# Patient Record
Sex: MALE | NOT HISPANIC OR LATINO
[De-identification: names, ages, dates, MRNs, and addresses within clinical notes are randomized per-mention and may not be internally consistent; named-entity substitution may affect disease eponyms.]

---

## 2017-07-10 PROBLEM — Z00.00 ENCOUNTER FOR PREVENTIVE HEALTH EXAMINATION: Status: ACTIVE | Noted: 2017-07-10

## 2017-07-30 ENCOUNTER — TRANSCRIPTION ENCOUNTER (OUTPATIENT)
Age: 67
End: 2017-07-30

## 2017-08-14 ENCOUNTER — TRANSCRIPTION ENCOUNTER (OUTPATIENT)
Age: 67
End: 2017-08-14

## 2018-02-03 ENCOUNTER — TRANSCRIPTION ENCOUNTER (OUTPATIENT)
Age: 68
End: 2018-02-03

## 2020-03-24 ENCOUNTER — INPATIENT (INPATIENT)
Facility: HOSPITAL | Age: 70
LOS: 2 days | Discharge: HOME | DRG: 917 | End: 2020-03-27
Attending: INTERNAL MEDICINE | Admitting: INTERNAL MEDICINE
Payer: MEDICARE

## 2020-03-24 VITALS
HEIGHT: 68 IN | HEART RATE: 108 BPM | WEIGHT: 229.94 LBS | SYSTOLIC BLOOD PRESSURE: 123 MMHG | DIASTOLIC BLOOD PRESSURE: 77 MMHG | OXYGEN SATURATION: 100 % | RESPIRATION RATE: 20 BRPM | TEMPERATURE: 98 F

## 2020-03-24 DIAGNOSIS — E87.0 HYPEROSMOLALITY AND HYPERNATREMIA: ICD-10-CM

## 2020-03-24 DIAGNOSIS — Z29.9 ENCOUNTER FOR PROPHYLACTIC MEASURES, UNSPECIFIED: ICD-10-CM

## 2020-03-24 DIAGNOSIS — T28.6XXA CORROSION OF ESOPHAGUS, INITIAL ENCOUNTER: ICD-10-CM

## 2020-03-24 DIAGNOSIS — T30.0 BURN OF UNSPECIFIED BODY REGION, UNSPECIFIED DEGREE: ICD-10-CM

## 2020-03-24 DIAGNOSIS — R79.9 ABNORMAL FINDING OF BLOOD CHEMISTRY, UNSPECIFIED: ICD-10-CM

## 2020-03-24 DIAGNOSIS — E87.2 ACIDOSIS: ICD-10-CM

## 2020-03-24 DIAGNOSIS — T14.91XA SUICIDE ATTEMPT, INITIAL ENCOUNTER: ICD-10-CM

## 2020-03-24 DIAGNOSIS — Z96.651 PRESENCE OF RIGHT ARTIFICIAL KNEE JOINT: Chronic | ICD-10-CM

## 2020-03-24 DIAGNOSIS — R93.89 ABNORMAL FINDINGS ON DIAGNOSTIC IMAGING OF OTHER SPECIFIED BODY STRUCTURES: ICD-10-CM

## 2020-03-24 DIAGNOSIS — K92.0 HEMATEMESIS: ICD-10-CM

## 2020-03-24 DIAGNOSIS — F29 UNSPECIFIED PSYCHOSIS NOT DUE TO A SUBSTANCE OR KNOWN PHYSIOLOGICAL CONDITION: ICD-10-CM

## 2020-03-24 DIAGNOSIS — R00.0 TACHYCARDIA, UNSPECIFIED: ICD-10-CM

## 2020-03-24 DIAGNOSIS — Z98.890 OTHER SPECIFIED POSTPROCEDURAL STATES: Chronic | ICD-10-CM

## 2020-03-24 LAB
ALBUMIN SERPL ELPH-MCNC: 4.4 G/DL — SIGNIFICANT CHANGE UP (ref 3.3–5)
ALP SERPL-CCNC: 50 U/L — SIGNIFICANT CHANGE UP (ref 40–120)
ALT FLD-CCNC: 17 U/L — SIGNIFICANT CHANGE UP (ref 10–45)
ANION GAP SERPL CALC-SCNC: 16 MMOL/L — SIGNIFICANT CHANGE UP (ref 5–17)
ANION GAP SERPL CALC-SCNC: 19 MMOL/L — HIGH (ref 5–17)
APAP SERPL-MCNC: <5 UG/ML — LOW (ref 10–30)
APPEARANCE UR: CLEAR — SIGNIFICANT CHANGE UP
APTT BLD: 21.7 SEC — LOW (ref 27.5–36.3)
AST SERPL-CCNC: 30 U/L — SIGNIFICANT CHANGE UP (ref 10–40)
BASOPHILS # BLD AUTO: 0.04 K/UL — SIGNIFICANT CHANGE UP (ref 0–0.2)
BASOPHILS NFR BLD AUTO: 0.4 % — SIGNIFICANT CHANGE UP (ref 0–2)
BILIRUB SERPL-MCNC: 0.8 MG/DL — SIGNIFICANT CHANGE UP (ref 0.2–1.2)
BILIRUB UR-MCNC: NEGATIVE — SIGNIFICANT CHANGE UP
BUN SERPL-MCNC: 27 MG/DL — HIGH (ref 7–23)
BUN SERPL-MCNC: 34 MG/DL — HIGH (ref 7–23)
CALCIUM SERPL-MCNC: 9.1 MG/DL — SIGNIFICANT CHANGE UP (ref 8.4–10.5)
CALCIUM SERPL-MCNC: 9.8 MG/DL — SIGNIFICANT CHANGE UP (ref 8.4–10.5)
CHLORIDE SERPL-SCNC: 110 MMOL/L — HIGH (ref 96–108)
CHLORIDE SERPL-SCNC: 115 MMOL/L — HIGH (ref 96–108)
CO2 SERPL-SCNC: 20 MMOL/L — LOW (ref 22–31)
CO2 SERPL-SCNC: 21 MMOL/L — LOW (ref 22–31)
COLOR SPEC: YELLOW — SIGNIFICANT CHANGE UP
CREAT SERPL-MCNC: 1.1 MG/DL — SIGNIFICANT CHANGE UP (ref 0.5–1.3)
CREAT SERPL-MCNC: 1.21 MG/DL — SIGNIFICANT CHANGE UP (ref 0.5–1.3)
DIFF PNL FLD: NEGATIVE — SIGNIFICANT CHANGE UP
EOSINOPHIL # BLD AUTO: 0.04 K/UL — SIGNIFICANT CHANGE UP (ref 0–0.5)
EOSINOPHIL NFR BLD AUTO: 0.4 % — SIGNIFICANT CHANGE UP (ref 0–6)
ETHANOL SERPL-MCNC: <10 MG/DL — SIGNIFICANT CHANGE UP (ref 0–10)
GLUCOSE SERPL-MCNC: 166 MG/DL — HIGH (ref 70–99)
GLUCOSE SERPL-MCNC: 200 MG/DL — HIGH (ref 70–99)
GLUCOSE UR QL: NEGATIVE — SIGNIFICANT CHANGE UP
HCT VFR BLD CALC: 49.4 % — SIGNIFICANT CHANGE UP (ref 39–50)
HCT VFR BLD CALC: 49.6 % — SIGNIFICANT CHANGE UP (ref 39–50)
HGB BLD-MCNC: 15.6 G/DL — SIGNIFICANT CHANGE UP (ref 13–17)
HGB BLD-MCNC: 15.8 G/DL — SIGNIFICANT CHANGE UP (ref 13–17)
IMM GRANULOCYTES NFR BLD AUTO: 0.4 % — SIGNIFICANT CHANGE UP (ref 0–1.5)
INR BLD: 1.02 — SIGNIFICANT CHANGE UP (ref 0.88–1.16)
KETONES UR-MCNC: 15 MG/DL
LACTATE SERPL-SCNC: 1.9 MMOL/L — SIGNIFICANT CHANGE UP (ref 0.5–2)
LEUKOCYTE ESTERASE UR-ACNC: NEGATIVE — SIGNIFICANT CHANGE UP
LYMPHOCYTES # BLD AUTO: 1.09 K/UL — SIGNIFICANT CHANGE UP (ref 1–3.3)
LYMPHOCYTES # BLD AUTO: 11.5 % — LOW (ref 13–44)
MCHC RBC-ENTMCNC: 29.9 PG — SIGNIFICANT CHANGE UP (ref 27–34)
MCHC RBC-ENTMCNC: 30 PG — SIGNIFICANT CHANGE UP (ref 27–34)
MCHC RBC-ENTMCNC: 31.6 GM/DL — LOW (ref 32–36)
MCHC RBC-ENTMCNC: 31.9 GM/DL — LOW (ref 32–36)
MCV RBC AUTO: 94.1 FL — SIGNIFICANT CHANGE UP (ref 80–100)
MCV RBC AUTO: 94.6 FL — SIGNIFICANT CHANGE UP (ref 80–100)
MONOCYTES # BLD AUTO: 0.44 K/UL — SIGNIFICANT CHANGE UP (ref 0–0.9)
MONOCYTES NFR BLD AUTO: 4.6 % — SIGNIFICANT CHANGE UP (ref 2–14)
NEUTROPHILS # BLD AUTO: 7.83 K/UL — HIGH (ref 1.8–7.4)
NEUTROPHILS NFR BLD AUTO: 82.7 % — HIGH (ref 43–77)
NITRITE UR-MCNC: NEGATIVE — SIGNIFICANT CHANGE UP
NRBC # BLD: 0 /100 WBCS — SIGNIFICANT CHANGE UP (ref 0–0)
NRBC # BLD: 0 /100 WBCS — SIGNIFICANT CHANGE UP (ref 0–0)
OSMOLALITY SERPL: 324 MOSMOL/KG — HIGH (ref 280–301)
PCP SPEC-MCNC: SIGNIFICANT CHANGE UP
PH UR: 6 — SIGNIFICANT CHANGE UP (ref 5–8)
PLATELET # BLD AUTO: 195 K/UL — SIGNIFICANT CHANGE UP (ref 150–400)
PLATELET # BLD AUTO: 212 K/UL — SIGNIFICANT CHANGE UP (ref 150–400)
POTASSIUM SERPL-MCNC: 4.1 MMOL/L — SIGNIFICANT CHANGE UP (ref 3.5–5.3)
POTASSIUM SERPL-MCNC: 4.2 MMOL/L — SIGNIFICANT CHANGE UP (ref 3.5–5.3)
POTASSIUM SERPL-SCNC: 4.1 MMOL/L — SIGNIFICANT CHANGE UP (ref 3.5–5.3)
POTASSIUM SERPL-SCNC: 4.2 MMOL/L — SIGNIFICANT CHANGE UP (ref 3.5–5.3)
PROT SERPL-MCNC: 7.5 G/DL — SIGNIFICANT CHANGE UP (ref 6–8.3)
PROT UR-MCNC: ABNORMAL MG/DL
PROTHROM AB SERPL-ACNC: 11.6 SEC — SIGNIFICANT CHANGE UP (ref 10–12.9)
RBC # BLD: 5.22 M/UL — SIGNIFICANT CHANGE UP (ref 4.2–5.8)
RBC # BLD: 5.27 M/UL — SIGNIFICANT CHANGE UP (ref 4.2–5.8)
RBC # FLD: 13.9 % — SIGNIFICANT CHANGE UP (ref 10.3–14.5)
RBC # FLD: 14.2 % — SIGNIFICANT CHANGE UP (ref 10.3–14.5)
SALICYLATES SERPL-MCNC: <0.3 MG/DL — LOW (ref 2.8–20)
SODIUM SERPL-SCNC: 150 MMOL/L — HIGH (ref 135–145)
SODIUM SERPL-SCNC: 151 MMOL/L — HIGH (ref 135–145)
SP GR SPEC: >=1.03 — SIGNIFICANT CHANGE UP (ref 1–1.03)
UROBILINOGEN FLD QL: 0.2 E.U./DL — SIGNIFICANT CHANGE UP
WBC # BLD: 19.03 K/UL — HIGH (ref 3.8–10.5)
WBC # BLD: 9.48 K/UL — SIGNIFICANT CHANGE UP (ref 3.8–10.5)
WBC # FLD AUTO: 19.03 K/UL — HIGH (ref 3.8–10.5)
WBC # FLD AUTO: 9.48 K/UL — SIGNIFICANT CHANGE UP (ref 3.8–10.5)

## 2020-03-24 PROCEDURE — 71045 X-RAY EXAM CHEST 1 VIEW: CPT | Mod: 26

## 2020-03-24 PROCEDURE — 71250 CT THORAX DX C-: CPT | Mod: 26

## 2020-03-24 PROCEDURE — 99223 1ST HOSP IP/OBS HIGH 75: CPT | Mod: GC

## 2020-03-24 PROCEDURE — 99223 1ST HOSP IP/OBS HIGH 75: CPT

## 2020-03-24 PROCEDURE — 99285 EMERGENCY DEPT VISIT HI MDM: CPT | Mod: 25

## 2020-03-24 PROCEDURE — 93010 ELECTROCARDIOGRAM REPORT: CPT

## 2020-03-24 RX ORDER — PANTOPRAZOLE SODIUM 20 MG/1
40 TABLET, DELAYED RELEASE ORAL ONCE
Refills: 0 | Status: COMPLETED | OUTPATIENT
Start: 2020-03-24 | End: 2020-03-24

## 2020-03-24 RX ORDER — ONDANSETRON 8 MG/1
4 TABLET, FILM COATED ORAL ONCE
Refills: 0 | Status: COMPLETED | OUTPATIENT
Start: 2020-03-24 | End: 2020-03-24

## 2020-03-24 RX ORDER — SODIUM CHLORIDE 9 MG/ML
1000 INJECTION, SOLUTION INTRAVENOUS
Refills: 0 | Status: DISCONTINUED | OUTPATIENT
Start: 2020-03-24 | End: 2020-03-25

## 2020-03-24 RX ORDER — PANTOPRAZOLE SODIUM 20 MG/1
40 TABLET, DELAYED RELEASE ORAL EVERY 12 HOURS
Refills: 0 | Status: DISCONTINUED | OUTPATIENT
Start: 2020-03-24 | End: 2020-03-27

## 2020-03-24 RX ORDER — PANTOPRAZOLE SODIUM 20 MG/1
8 TABLET, DELAYED RELEASE ORAL
Qty: 80 | Refills: 0 | Status: DISCONTINUED | OUTPATIENT
Start: 2020-03-24 | End: 2020-03-24

## 2020-03-24 RX ORDER — SODIUM CHLORIDE 9 MG/ML
1000 INJECTION INTRAMUSCULAR; INTRAVENOUS; SUBCUTANEOUS ONCE
Refills: 0 | Status: COMPLETED | OUTPATIENT
Start: 2020-03-24 | End: 2020-03-24

## 2020-03-24 RX ADMIN — ONDANSETRON 4 MILLIGRAM(S): 8 TABLET, FILM COATED ORAL at 06:14

## 2020-03-24 RX ADMIN — PANTOPRAZOLE SODIUM 40 MILLIGRAM(S): 20 TABLET, DELAYED RELEASE ORAL at 05:00

## 2020-03-24 RX ADMIN — PANTOPRAZOLE SODIUM 40 MILLIGRAM(S): 20 TABLET, DELAYED RELEASE ORAL at 19:48

## 2020-03-24 RX ADMIN — SODIUM CHLORIDE 1000 MILLILITER(S): 9 INJECTION INTRAMUSCULAR; INTRAVENOUS; SUBCUTANEOUS at 05:30

## 2020-03-24 RX ADMIN — SODIUM CHLORIDE 100 MILLILITER(S): 9 INJECTION, SOLUTION INTRAVENOUS at 13:18

## 2020-03-24 RX ADMIN — PANTOPRAZOLE SODIUM 10 MG/HR: 20 TABLET, DELAYED RELEASE ORAL at 07:13

## 2020-03-24 RX ADMIN — SODIUM CHLORIDE 1000 MILLILITER(S): 9 INJECTION INTRAMUSCULAR; INTRAVENOUS; SUBCUTANEOUS at 05:00

## 2020-03-24 RX ADMIN — Medication 200 MILLIGRAM(S): at 16:10

## 2020-03-24 NOTE — CONSULT NOTE ADULT - SUBJECTIVE AND OBJECTIVE BOX
HPI: 70 year old male with PMH psych disorders (patient states that he thinks he has bipolar disorder and schizophrenia) who presents after an intentional overdose.  The patient states that he has had hot flashes, runny nose and sore throat for months and thinks he has been spreading coronavirus for months.  He drank half of a bottle of Chlorox because he feels responsible for the pandemic.  The patient started to vomit (stated it was red and black) at which point his roommate called 911.  Upon arrival to the ED, vital signs were /77, , temperature 97.8 degrees Farenheit and saturating 100% on room air.  Labs were signifcant for Na 150, Cl 110, bicaronate 21, anion gap 19, BUN 27.    Allergies    No Known Allergies    Intolerances      Home Medications:    MEDICATIONS:  MEDICATIONS  (STANDING):  pantoprazole Infusion 8 mG/Hr (10 mL/Hr) IV Continuous <Continuous>    MEDICATIONS  (PRN):    PAST MEDICAL & SURGICAL HISTORY:  Schizophrenia  Bipolar 1 disorder  Depression    FAMILY HISTORY:    SOCIAL HISTORY:  [place]: [ ] Current, [ ] Former, [ ] Never; Pack Years:  Alcohol:  Illicit Drugs:    REVIEW OF SYSTEMS:  CONSTITUTIONAL: No weakness, fevers or chills  HEENT: No visual changes; No vertigo or throat pain   NECK: No pain or stiffness  RESPIRATORY: No cough, wheezing, hemoptysis; No shortness of breath  CARDIOVASCULAR: No chest pain or palpitations  GASTROINTESTINAL: No abdominal or epigastric pain. No nausea, vomiting, or hematemesis; No diarrhea or constipation. No melena or hematochezia.  GENITOURINARY: No dysuria, frequency or hematuria  NEUROLOGICAL: No numbness or weakness  SKIN: No itching, burning, rashes, or lesions   All other 10 review of systems is negative unless indicated above.    Vital Signs Last 24 Hrs  T(C): 36.6 (24 Mar 2020 04:49), Max: 36.6 (24 Mar 2020 04:49)  T(F): 97.8 (24 Mar 2020 04:49), Max: 97.8 (24 Mar 2020 04:49)  HR: 90 (24 Mar 2020 07:21) (90 - 108)  BP: 168/88 (24 Mar 2020 07:21) (123/77 - 168/88)  BP(mean): --  RR: 20 (24 Mar 2020 07:21) (20 - 20)  SpO2: 98% (24 Mar 2020 07:21) (98% - 100%)      PHYSICAL EXAM:    General: Well developed; well nourished; in no acute distress  Eyes: Anicteric sclerae, moist conjunctivae  HENT: Moist mucous membranes  Neck: Trachea midline, supple  Lungs: Normal respiratory effors and no intercostal retractions  Cardiovascular: RRR  Abdomen: Soft, non-tender non-distended; Normal bowel sounds; No rebound or guarding  Extremities: Normal range of motion, No clubbing, cyanosis or edema  Neurological: Alert and oriented x3  Skin: Warm and dry. No obvious rash    LABS:                        15.8   9.48  )-----------( 212      ( 24 Mar 2020 05:15 )             49.6     03-24    150<H>  |  110<H>  |  27<H>  ----------------------------<  200<H>  4.2   |  21<L>  |  1.21    Ca    9.8      24 Mar 2020 05:15    TPro  7.5  /  Alb  4.4  /  TBili  0.8  /  DBili  x   /  AST  30  /  ALT  17  /  AlkPhos  50  03-24        PT/INR - ( 24 Mar 2020 05:15 )   PT: 11.6 sec;   INR: 1.02          PTT - ( 24 Mar 2020 05:15 )  PTT:21.7 sec    RADIOLOGY & ADDITIONAL STUDIES: HPI: 70 year old male with PMH psych disorders (patient states that he thinks he has bipolar disorder and schizophrenia) who presents after an intentional ingestion of bleach.  The patient states that he has had hot flashes, runny nose and sore throat for months and thinks he has been spreading coronavirus for months.  He drank half of a bottle of Chlorox because he feels responsible for the pandemic.  The patient started to vomit (stated it was secretion with some brown stuff at which point his roommate called 911.  Upon arrival to the ED, vital signs were /77, , temperature 97.8 degrees Farenheit and saturating 100% on room air.  Labs were signifcant for Na 150, Cl 110, bicaronate 21, anion gap 19, BUN 27.    Allergies    No Known Allergies    Intolerances      Home Medications:    MEDICATIONS:  MEDICATIONS  (STANDING):  pantoprazole Infusion 8 mG/Hr (10 mL/Hr) IV Continuous <Continuous>    MEDICATIONS  (PRN):    PAST MEDICAL & SURGICAL HISTORY:  Schizophrenia  Bipolar 1 disorder  Depression    FAMILY HISTORY:    SOCIAL HISTORY:  [place]: [ ] Current, [ ] Former, [ ] Never; Pack Years:  Alcohol:  Illicit Drugs:    REVIEW OF SYSTEMS:  CONSTITUTIONAL: No weakness, fevers or chills  HEENT: No visual changes; No vertigo or throat pain   NECK: No pain or stiffness  RESPIRATORY: No cough, wheezing, hemoptysis; No shortness of breath  CARDIOVASCULAR: No chest pain or palpitations  GASTROINTESTINAL: No abdominal or epigastric pain. No  hematemesis; No constipation. No melena or hematochezia.  GENITOURINARY: No dysuria, frequency or hematuria  NEUROLOGICAL: No numbness or weakness  SKIN: No itching, burning, rashes, or lesions   All other 10 review of systems is negative unless indicated above.    Vital Signs Last 24 Hrs  T(C): 36.6 (24 Mar 2020 04:49), Max: 36.6 (24 Mar 2020 04:49)  T(F): 97.8 (24 Mar 2020 04:49), Max: 97.8 (24 Mar 2020 04:49)  HR: 90 (24 Mar 2020 07:21) (90 - 108)  BP: 168/88 (24 Mar 2020 07:21) (123/77 - 168/88)  BP(mean): --  RR: 20 (24 Mar 2020 07:21) (20 - 20)  SpO2: 98% (24 Mar 2020 07:21) (98% - 100%)      PHYSICAL EXAM:    General:  no acute distress  Eyes: Anicteric sclerae, moist conjunctivae  HENT: Moist mucous membranes  Neck: Trachea midline, supple  Lungs: Normal respiratory effors and no intercostal retractions  Cardiovascular: RRR  Abdomen: Soft, non-tender non-distended; Normal bowel sounds; No rebound or guarding  Extremities: Normal range of motion, No clubbing, cyanosis or edema  Neurological: Alert and awake, answers some questions   Skin: Warm and dry. No obvious rash    LABS:                        15.8   9.48  )-----------( 212      ( 24 Mar 2020 05:15 )             49.6     03-24    150<H>  |  110<H>  |  27<H>  ----------------------------<  200<H>  4.2   |  21<L>  |  1.21    Ca    9.8      24 Mar 2020 05:15    TPro  7.5  /  Alb  4.4  /  TBili  0.8  /  DBili  x   /  AST  30  /  ALT  17  /  AlkPhos  50  03-24        PT/INR - ( 24 Mar 2020 05:15 )   PT: 11.6 sec;   INR: 1.02          PTT - ( 24 Mar 2020 05:15 )  PTT:21.7 sec    RADIOLOGY & ADDITIONAL STUDIES:

## 2020-03-24 NOTE — BEHAVIORAL HEALTH ASSESSMENT NOTE - OTHER PAST PSYCHIATRIC HISTORY (INCLUDE DETAILS REGARDING ONSET, COURSE OF ILLNESS, INPATIENT/OUTPATIENT TREATMENT)
one psychiatric admission, at [facility], 6-7 years ago, for 5 week after a SA by trying to hang himself with a belt

## 2020-03-24 NOTE — BEHAVIORAL HEALTH ASSESSMENT NOTE - NSBHSOCIALHXDETAILSFT_PSY_A_CORE
Patient is a very talented , currently retired. Has been in a relationship with Grady for the last 40 years. He has a brother and a sister.

## 2020-03-24 NOTE — ED ADULT NURSE NOTE - NSFALLRSKUNASSIST_ED_ALL_ED
Your current Orthopaedic problem we are working together to treat is:  Knee replacement.    Hose for 2 more weeks  Walker for 4 more weeks  Can shower, no bath or hot tub  Therapy 2 times a week    It is recommended you schedule a follow-up appointment with Shawn Smith MD in 4 weeks.    Office hours are 8:00 am to 5:00 pm Monday through Friday. If it is urgent that you speak with someone outside of these hours, our [facility] will be able to assist you. You can reach the office by calling the [facility] scheduling line at [phone].    We do highly recommend S[employer] NOW, if you do not already have this. You can request access via the internet or by simply talking with a  at any of the clinics.  [email or web address].      Thank you for choosing [facility] as your Orthopaedic provider!    
no

## 2020-03-24 NOTE — H&P ADULT - ASSESSMENT
70M with extensive psych history now presents from home after hematemesis in the setting of caustic ingestion

## 2020-03-24 NOTE — H&P ADULT - PROBLEM SELECTOR PLAN 7
Likely 2/2 to caustic ingestion causing upper GI irritation. Management as above per GI    #hyperglycemia and obesity  - A1c  - insulin coverage as necessary pending a1c

## 2020-03-24 NOTE — ED ADULT NURSE NOTE - OTHER COMPLAINTS
lee from home pt reports drinking "a lot" of clorox tonight because he wanted to kill himself.  c/o of abd pain, nausea and vomiting.  Hx of mental health problem

## 2020-03-24 NOTE — CONSULT NOTE ADULT - ASSESSMENT
70 year old male with PMH psych disorders (patient states that he thinks he has bipolar disorder and schizophrenia) who presents after an intentional overdose.    Cardiovascular  #Tachycardia  -Patient with very dry mucouswith half normal saline at 150cc/hour    GI  #Hematemesis  -Patient drank half of a bottle of Chlorox in a suicide attempt and afterwards started to have red and black vomiting per patient.  Hemoglobin currently 15.8 and patient hemodynamically stable.  No visible hematemesis on exam.  Would call poison control for evaluation  Patient needs to be NPO with PPI as well as GI evaluation for endoscopy after toxic ingestion.    Psych  #Depression/bipolar/schizophrenia  -Patient states he has depression for which he takes Lexapro and thinks he has bipolar disorder and schizophrenia as well, would have patient on constant observation and needs psychiatric evaluation as he intentionally overdosed by drinking half of a bottle of Chlorox.    No indication for telemetry at this time, please reconsult as needed 70 year old male with PMH psych disorders (patient states that he thinks he has bipolar disorder and schizophrenia) who presents after an intentional overdose.    GI  #Hematemesis  -Patient drank half of a bottle of Chlorox in a suicide attempt and afterwards started to have red and black vomiting per patient.  Hemoglobin currently 15.8 and patient hemodynamically stable.  No visible hematemesis on exam.  Would call poison control for evaluation.  Patient needs to be NPO with PPI as well as GI evaluation for endoscopy after toxic ingestion.  Patient with large amounts of emesis and very dry mucous membranes on exam with tachycardia, would start 150cc/hour of half normal saline as patient with high sodium and chloride.    Psych  #Depression/bipolar/schizophrenia  -Patient states he has depression for which he takes Lexapro and thinks he has bipolar disorder and schizophrenia as well, would have patient on constant observation and needs psychiatric evaluation as he intentionally overdosed by drinking half of a bottle of Chlorox.    No indication for telemetry at this time, please reconsult as needed

## 2020-03-24 NOTE — ED PROVIDER NOTE - PHYSICAL EXAMINATION
GEN: elderly, awake, alert, oriented to person, place, time/situation and in distress, active coffee ground emesis  ENT: Airway patent, Nasal mucosa clear. Mouth with erythematous mucosa, no stridor  EYES: Clear bilaterally. PERRL, EOMI  RESPIRATORY: Breathing comfortably with normal RR. No W/C/R, no hypoxia or resp distress.  CARDIAC: Regular rate and rhythm, no M/R/G  ABDOMEN: Soft, nontender, +bowel sounds, no rebound, rigidity, or guarding.  MSK: Range of motion is not limited, no deformities noted.  NEURO: Alert and oriented, no focal deficits.  SKIN: Skin normal color for race, warm, dry and intact. No evidence of rash.  PSYCH: Alert and oriented to person, place, time/situation. perseverating, anxious mood and affect. no apparent risk to self or others.

## 2020-03-24 NOTE — CONSULT NOTE ADULT - ATTENDING COMMENTS
This patient was evaluated briefly with the resident and management decisions were made, see above for the details, I agree with the A/p.  -toxic chemical ingestion  -suicide attempt  -bipolar disorder  -hypernatremia  >poison control  >psych eval  >IVF  >PPI  >GI  This patient can be managed outside of the ICU currently, you may reconsult us as needed.

## 2020-03-24 NOTE — H&P ADULT - PROBLEM SELECTOR PLAN 8
CT scan as above, given localization to R lung suspect aspiration pneumonitis. low suspicion for covid as patient without cough, fevers, sob, or lymphopenia  - continue to monitor O2 needs.

## 2020-03-24 NOTE — ED ADULT NURSE REASSESSMENT NOTE - NS ED NURSE REASSESS COMMENT FT1
Pt arrives to the ER Alert and Oriented X3. Disoriented to place. Effect: depressed, tearful, cooperative, well-groomed. Security at bedside, warded pt and belongings. Pt was on 1:1 observation for safety.

## 2020-03-24 NOTE — H&P ADULT - NSHPREVIEWOFSYSTEMS_GEN_ALL_CORE
REVIEW OF SYSTEMS:  CONSTITUTIONAL: No fevers or chills  EYES/ENT: + throat pain  NECK: No pain or stiffness  RESPIRATORY: No cough, wheezing; No shortness of breath  CARDIOVASCULAR: No chest pain or palpitations  GASTROINTESTINAL: No abdominal pain. No nausea, vomiting; No diarrhea or constipation.  GENITOURINARY: No dysuria, frequency or hematuria  NEUROLOGICAL: No numbness or weakness  SKIN: No itching, burning, rashes, or lesions   PSYCH: + PARANOIA and PERSEVERATION  All other review of systems is negative unless indicated above.

## 2020-03-24 NOTE — CONSULT NOTE ADULT - ASSESSMENT
70 year old male with PMH psych disorders (patient states that he thinks he has bipolar disorder and schizophrenia) who presents after an intentional ingestion of bleach    Caustic ingestion: household bleach  -vss, maintaining airway, hb wnl (looks hemoconcentrated)  -no vomiting at present, denies pain abdomen, no melena   -CT shows esophageal wall thickening, no perforation   -per literature review household bleach has a ph around 10-11 that is not very high to cause significant/penetrating mucosal injury and most pts recover well  - currently npo, start clears advance slowly as tolerated  -IVF, looks dry on exam ( expect hb to go down with rehydration)  -PPI bid, zofran scheduled   -no indication for urgent endoscopic evaluation  this time

## 2020-03-24 NOTE — H&P ADULT - NSHPSOCIALHISTORY_GEN_ALL_CORE
Patient currently  living with his partner-Noah nicholson [phone]. Patient does not use walker or cane. Has attempted suicide in past.

## 2020-03-24 NOTE — H&P ADULT - PROBLEM SELECTOR PLAN 6
increased anion gap metabolic acidosis POA. lactate WNL, calcium oxylate noted in urine. Per poison control bleech can cause metabolic acidosis and also bowel necrosis. Low suspicion for bowel necrosis currently as GI exam benign and lactate WNL.   - monitor bmp-recheck in evening  - serum osm, and serum ethylene glycol level to be sent due to evidence of calcium oxylate crystals in urine.

## 2020-03-24 NOTE — H&P ADULT - PROBLEM SELECTOR PLAN 10
DVT ppx: will hold pending UGIB  GI ppx: c/w protonix    F: IVF as needed  E: K > 4. Mg >2  N: NPO for now

## 2020-03-24 NOTE — H&P ADULT - PROBLEM SELECTOR PLAN 5
likely multifactorial as patient with ingestion of sodium hypochlorite this am, per poison control can cause transient hypernatremia. Additionally, with spec grav 1.030 patient likely dry. Patient asymptomatic, and alert and orient x3 and mentating well now s/p 2L NS in ED.  - Trend BMP  - monitor mental status

## 2020-03-24 NOTE — BEHAVIORAL HEALTH ASSESSMENT NOTE - HPI (INCLUDE ILLNESS QUALITY, SEVERITY, DURATION, TIMING, CONTEXT, MODIFYING FACTORS, ASSOCIATED SIGNS AND SYMPTOMS)
70M with  with unclear PPH, likely bipolar disorder, one prior psychiatric admission s/p SA, presenting to [facility] after hematemesis in the setting of caustic ingestion as a suicide attempt.   The interview with the patient was limited due to patient having physical discomfort and severe nausea. Collaterals were obtained from patient's lifelong partner Grady Emanuel.    As per the patient, he has been feeling depressed, anxious and very guilty during the last few weeks in context of having thoughts that he triggered the current Covid 19 epidemic. Patient reports that he has been having Si for the last 1-2 weeks. Yesterday night he became very scared that his partner  (checked his pulse and couldn't find it). He went to the bathtub and started drinking from a bottle of bleach in a suicide attempt.  As per his partner, patient has been very paranoid for the last 2-3 week making statements that he caused the epidemic and "they were being watched from across the hallway", " they are going to come in to get us and put us away". Patient was also having severe insomnia and high anxiety. PAtient's partner states that last week he heard the patient making choking sounds and found him with a mouthful of coins (tried to choke himself). Patient was not taken to the ER at that time. Yesterday, Grady was woken up at 4am by the smell of bleach and found the patient in the bathtub covered in blood.   As per Grady, patient saw for 2 years a therapist (until the therapist moved to [place]). Patient never saw a psychiatrist. He is currently prescribed 70M with  with unclear PPH, likely bipolar disorder, one prior psychiatric admission s/p SA, presenting to [facility] after hematemesis in the setting of caustic ingestion as a suicide attempt.   The interview with the patient was limited due to patient having physical discomfort and severe nausea. Collaterals were obtained from patient's lifelong partner Grady Emanuel.    As per the patient, he has been feeling depressed, anxious and very guilty during the last few weeks in context of having thoughts that he triggered the current Covid 19 epidemic. Patient reports that he has been having Si for the last 1-2 weeks. Yesterday night he became very scared that his partner  (checked his pulse and couldn't find it). He went to the bathtub and started drinking from a bottle of bleach in a suicide attempt.  As per his partner, patient has been very paranoid for the last 2-3 week making statements that he caused the epidemic and "they were being watched from across the hallway", " they are going to come in to get us and put us away". Patient was also having severe insomnia and high anxiety. PAtient's partner states that last week he heard the patient making choking sounds and found him with a mouthful of coins (tried to choke himself). Patient was not taken to the ER at that time. Yesterday, Grady was woken up at 4am by the smell of bleach and found the patient in the bathtub covered in blood.   As per Grady, patient saw for 2 years a therapist (until the therapist moved to [place]). Patient never saw a psychiatrist. He is currently prescribed by PMD lexapro either 20mg or 30mg daily (unclear at this time) and trazodone 50mg po qhs. 70M with  with unclear PPH, likely bipolar disorder, one prior psychiatric admission s/p SA, presenting to [facility] after hematemesis in the setting of caustic ingestion as a suicide attempt.   The interview with the patient was limited due to patient having physical discomfort and severe nausea. Collaterals were obtained from patient's lifelong partner Grady Emanuel.    As per the patient, he has been feeling depressed, anxious and very guilty during the last few weeks in context of having thoughts that he triggered the current Covid 19 epidemic. Patient reports that he has been having Si for the last 1-2 weeks. Yesterday night he became very scared that his partner  (checked his pulse and couldn't find it). He went to the bathtub and started drinking from a bottle of bleach in a suicide attempt.  As per his partner, patient has been very paranoid for the last 2-3 weeks making statements that he caused the epidemic and "they were being watched from across the hallway", " they are going to come in to get us and put us away". Patient was also having severe insomnia and high anxiety. PAtient's partner states that last week he heard the patient making choking sounds and found him with a mouthful of coins (tried to choke himself). Patient was not taken to the ER at that time. Yesterday, Grady was woken up at 4am by the smell of bleach and found the patient in the bathtub covered in blood.   As per Grady, patient saw for 2 years a therapist (until the therapist moved to [place]). Patient never saw a psychiatrist. He is currently prescribed by PMD lexapro either 20mg or 30mg daily (unclear at this time) and trazodone 50mg po qhs.

## 2020-03-24 NOTE — BEHAVIORAL HEALTH ASSESSMENT NOTE - SUICIDE RISK FACTORS
Agitation/Severe Anxiety/Panic/Impulsivity/Insomnia/Psychotic disorder current/past/Hopelessness or despair

## 2020-03-24 NOTE — H&P ADULT - NSHPLABSRESULTS_GEN_ALL_CORE
.  LABS:                         15.8   9.48  )-----------( 212      ( 24 Mar 2020 05:15 )             49.6     03-24    150<H>  |  110<H>  |  27<H>  ----------------------------<  200<H>  4.2   |  21<L>  |  1.21    Ca    9.8      24 Mar 2020 05:15    TPro  7.5  /  Alb  4.4  /  TBili  0.8  /  DBili  x   /  AST  30  /  ALT  17  /  AlkPhos  50  03-24    PT/INR - ( 24 Mar 2020 05:15 )   PT: 11.6 sec;   INR: 1.02          PTT - ( 24 Mar 2020 05:15 )  PTT:21.7 sec  Urinalysis Basic - ( 24 Mar 2020 07:09 )    Color: Yellow / Appearance: Clear / SG: >=1.030 / pH: x  Gluc: x / Ketone: 15 mg/dL  / Bili: Negative / Urobili: 0.2 E.U./dL   Blood: x / Protein: Trace mg/dL / Nitrite: NEGATIVE   Leuk Esterase: NEGATIVE / RBC: < 5 /HPF / WBC < 5 /HPF   Sq Epi: x / Non Sq Epi: 0-5 /HPF / Bacteria: Present /HPF      CARDIAC MARKERS ( 24 Mar 2020 05:15 )  x     / 0.01 ng/mL / 157 U/L / x     / x            Lactate, Blood: 1.9 mmol/L (03-24 @ 08:38)      < from: CT Chest No Cont (03.24.20 @ 07:45) >      Findings:     Lungs and large airways: There are several faint areas of centrilobular groundglass opacities in the right upper lobe -apical and posterior segments and lower lobe-superior segment. Differential diagnosis includes aspiration, edema, hemorrhage, infection.    There are numerous calcified lung granulomas bilaterally.     Pleura:  No pleural effusion.    Mediastinum and hilar regions: No thoracic lymphadenopathy. Small calcified mediastinal and right hilar nodes.    Heart and pericardium:  Heart size is normal. No pericardial effusion.    Vessels:  Severe coronary artery disease. Trace calcified plaque aorta.    Chest wall and lower neck:  Normal.    Upper abdomen: Diffuse thoracic and abdominal esophagealwall thickening likely due to esophagitis from caustic ingestion. No esophageal perforation. No pneumomediastinum.      Bones: Mild degenerative changes.      Impression:  1. Diffuse esophageal wall thickening likely esophagitis in setting of causticingestion. No esophageal perforation. No pneumomediastinum.  2. Ground glass opacities seen in the right upper and lower lobe. Differential includes aspiration, edema, hemorrhage, infection.     < end of copied text >

## 2020-03-24 NOTE — H&P ADULT - HISTORY OF PRESENT ILLNESS
70M with psych pmhx (?bipolar disorder, ?schizophrenia-with past suicide attempts) presents after hematemesis in the setting of caustic ingestion. Patient has been feeling sad, depressed and guilty for last 4 months in addition to experiencing multiple "hot flashes" and runny nose and has now been concerned that he is responsible for the coronavirus pandemic. Because of the guilt associated with his self blame for covid pandemic (and acute episode of psychosis when he believed his partner had passed away) patient attempted to intentionally overdose this am by drinking half a bottle of bleach while laying down in bathtub. After episode patient experienced multiple episodes of emesis with bloody and black flecks in the vomitus-unable to quantify. Did not attempt PO afterwards. Additionally patient notes bottle of bleach knocked over while laying in bath tub with bleach coming into contact with his back. After episode patient noted coughing but denies light-headedness, CP, sob, stridor, or stomach pain.     ED course:   Vitals: T afebrile, HR , //88, RR 20, SPo2 % on room air  Labs: no leukocytosis (no bands, no lymphopenia), Hgb 15.8, , Na 150, Cl 110, Bicarb 21-gap 19, BUN 27, glucose 200, LFTs WNL, utox negative, acetaminophen level <5,  BAL< 10, salicylate <0.3. Lactate 1.9. Utox negative. UA spec grav 1.030, ketones present, calcium oxylate crystals present, no hematuria. Proteinuria.   Imaging: CT Chest No Cont  1. Diffuse esophageal wall thickening likely esophagitis in setting of causticingestion. No esophageal perforation. No pneumomediastinum.  2. Ground glass opacities seen in the right upper and lower lobe. Differential includes aspiration, edema, hemorrhage, infection.   Meds: 1L NS, 4mg IV zofran, 40mg IV protnix and was started on a protonix drip  Consults: ICU, Psych, GI

## 2020-03-24 NOTE — ED PROVIDER NOTE - CLINICAL SUMMARY MEDICAL DECISION MAKING FREE TEXT BOX
70M with a psych hx and previous suicide attempts who p/w suicide attempts by caustic ingestion after drinking almost an entire bottle of bleach. Pt with erythema of oral cavity and coffee ground emesis, currently protecting airway but will monitor closely. Labs, CXR and CT chest ordered to r/o perforation. IVFs, Protonix bolus/gtt, and zofran ordered. No indication for charcoal or emetics. Pt claims he has had sx of coronavirus for "months" and that he is responsible for "infecting everyone" however does not currently have fever or URI/resp symptoms to warrant covid19 testing. Pt is not medically cleared for psych eval, he was placed on a 1:1. Will require admission, ICU consult and Gi consult.

## 2020-03-24 NOTE — H&P ADULT - PROBLEM SELECTOR PLAN 4
Patient tachycardic to 108, BPS normotensive to hypertensive. Likely 2/2 to pain vs. anxiety. Tachycardia now resolved and BPs normotensive. Patient mentating and making urine, VSS. Low suspicion for active bleed.  - monitor vitals  - maintain IVF access as needed  - type and screen

## 2020-03-24 NOTE — H&P ADULT - NSHPPHYSICALEXAM_GEN_ALL_CORE
Vital Signs (24 Hrs):  T(C): 37.2 (03-24-20 @ 09:04), Max: 37.2 (03-24-20 @ 09:04)  HR: 77 (03-24-20 @ 09:04) (77 - 108)  BP: 154/94 (03-24-20 @ 09:04) (123/77 - 168/88)  RR: 18 (03-24-20 @ 09:04) (18 - 20)  SpO2: 98% (03-24-20 @ 09:04) (98% - 100%)  Wt(kg): --    PHYSICAL EXAM:  GENERAL: Obese male sitting in bed in NAD. Speaking in full sentences protecting airway  HEENT: no oropharyngeal erythema or ulcerations, dry blood noted surrounding mouth and on teeth  no stridor appreciated over trachea  CHEST/LUNG: No crepitus appreciated over chest wall. Lungs clear to auscultation bilaterally; No wheeze, rhonchi, or rales  HEART: Manual HR measured 64 BPM, RRR, S1S2, no murmurs.   ABDOMEN: Soft, Nontender, Nondistended; Bowel sounds +4. No guarding, rebound or rigidity, no peritoneal signs.  EXTREMITIES:  2+ Peripheral Pulses, blood stained fingers  PSYCH: AAOx3, cooperative, appropriate. Paranoid and perseverating on corona virus  NEUROLOGY: AAOx3, CN II-XII intact. Strength 5/5 throughout. Sensation intact. Appropriate cerebellar functions.   SKIN: Diffuse area of erythema noted on back and buttocks with no skin break down, no bullae, no pustules.

## 2020-03-24 NOTE — ED ADULT NURSE NOTE - OBJECTIVE STATEMENT
Pt coming from home after drinking half a bottle of Clorox 1 hour prior from ER arrival (with the intention of "killing myself). Pt states "I caused the coronavirus outbreak. Do not get close to me. I have the coronavirus." When EMS arrived at the scene, pt was vomiting at the sink with blood tinged emesis. Partner called 911.  Pt states he went to his PCP about 1 week ago, however patient was not tested because "I did not have the symptoms." Pt asked "are you gonna test me now." Pt denies any fever, chest pain,, or coughs. Pt report chills, and hot flashes for "couple months now." Pt states he also hears voices that tell him to "kill himself." Attempted suicide in the pst by "choking myself with a belt." Pt denies HI.
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
## 2020-03-24 NOTE — H&P ADULT - PROBLEM SELECTOR PLAN 1
Patient with episodes of emesis this am in the setting of caustic ingestion with bleach. Emesis noted to be black and bloody patient unable to quantify. Did not attempt to tolerate PO. [facility] who advised monitoring of HD status and GI consult. Patient currently tachycardic but manual HR count in ED 64 bpm. Mentating well, making urine, BPs normotensive hgb 15. Patient s/p 2L in ED given zofran and protonix. Bleed likely UGI 2/2 to caustic ingestion with bleach  - NPO for now, will c/w protonix gtt for now pending further GI recs  - GI following appreciate recs, for scope later today to evaluate extent of damage will keep NPO for now  - maintain 2 large bore IV access, monitor HD  - Type and screen

## 2020-03-24 NOTE — ED PROVIDER NOTE - OBJECTIVE STATEMENT
70M with a hx of depression and mental health issues (pt unable to state which, but states he takes "4 tranquilizers") who p/w suicide attempt by intentional bleach ingestion 30 min PTA. Pt states he drank almost the entire small (?64 oz) of bleach in an attempt to "kill the coronavirus." Pt states he has had symptoms "for months" and he wanted to kill himself bc he feels responsible for "infecting everyone." He also reports voices are telling him to kill himself. En route he vomited brownish emesis. He denies fevers, chills, or SOB. No recent travel or contact with known covid+ patient. He reports suicide attempt in the past by attempted strangulation with a belt around his neck.

## 2020-03-24 NOTE — H&P ADULT - PROBLEM SELECTOR PLAN 2
Discussed with poison control center, patient at risk for laryngeal edema and GI perforation. Currently patient speaking in full sentences, no stridor appreciated over upper airway on exam, no crepitus over chest wall, no evidence of free air on imaging. CT consistent with esophagitis.  - GI following appreciate recs  - for scope later today  - Plan as above  - NPO for now

## 2020-03-24 NOTE — BEHAVIORAL HEALTH ASSESSMENT NOTE - SUICIDE PROTECTIVE FACTORS
Positive therapeutic relationships/Ability to cope with stress/Identifies reasons for living/Supportive social network of family or friends/Responsibility to family and others

## 2020-03-24 NOTE — CONSULT NOTE ADULT - SUBJECTIVE AND OBJECTIVE BOX
[facility] SERVICE CONSULTATION NOTE    CC: Suicide attempt    HPI: 70 year old male with PMH psych disorders (patient states that he thinks he has bipolar disorder and schizophrenia) who presents after an intentional overdose.  The patient states that he has had hot flashes, runny nose and sore throat for months and thinks he has been spreading coronavirus for months.  He drank half of a bottle of Chlorox because he feels responsible for the pandemic.  The patient started to vomit (stated it was red and black) at which point his roommate called 911.  Upon arrival to the ED, vital signs were /77, , temperature 97.8 degrees Farenheit and saturating 100% on room air.  Labs were signifcant for Na 150, Cl 110, bicaronate 21, anion gap 19, BUN 27.  He received 1L NS, 4mg IV zofran, 40mg IV protnix and was started on a protonix drip at which point ICU was consutled    ROS:  Otherwise negative, except as specified in HPI.    PMH:    PSH:    FH:    SH:    ALLERGIES:    MEDICATIONS:    VITAL SIGNS:  ICU Vital Signs Last 24 Hrs  T(C): 36.6 (24 Mar 2020 04:49), Max: 36.6 (24 Mar 2020 04:49)  T(F): 97.8 (24 Mar 2020 04:49), Max: 97.8 (24 Mar 2020 04:49)  HR: 108 (24 Mar 2020 04:49) (108 - 108)  BP: 123/77 (24 Mar 2020 04:49) (123/77 - 123/77)  BP(mean): --  ABP: --  ABP(mean): --  RR: 20 (24 Mar 2020 04:49) (20 - 20)  SpO2: 100% (24 Mar 2020 04:49) (100% - 100%)    CAPILLARY BLOOD GLUCOSE          PHYSICAL EXAM:  Constitutional: resting comfortably in bed, NAD  HEENT: NC/AT; PERRL, anicteric sclera; no oropharyngeal erythema or exudates; MMM  Neck: supple, no appreciable JVD  Respiratory: CTA B/L, no W/R/R; respirations appear non-labored, conversive in full sentences  Cardiovascular: +S1/S2, RRR  Gastrointestinal: abdomen soft, NT/ND  Extremities: WWP; no clubbing, cyanosis or edema  Vascular: 2+ radial, femoral, and DP/PT pulses B/L  Dermatologic: skin normal color and turgor; no visible rashes  Neurological:     LABS:                        15.8   9.48  )-----------( 212      ( 24 Mar 2020 05:15 )             49.6     03-24    150<H>  |  110<H>  |  27<H>  ----------------------------<  200<H>  4.2   |  21<L>  |  1.21    Ca    9.8      24 Mar 2020 05:15    TPro  7.5  /  Alb  4.4  /  TBili  0.8  /  DBili  x   /  AST  30  /  ALT  17  /  AlkPhos  50  03-24    PT/INR - ( 24 Mar 2020 05:15 )   PT: 11.6 sec;   INR: 1.02          PTT - ( 24 Mar 2020 05:15 )  PTT:21.7 sec              EKG: Reviewed.    RADIOLOGY & ADDITIONAL TESTS: Reviewed. [facility] SERVICE CONSULTATION NOTE    CC: Suicide attempt    HPI: 70 year old male with PMH psych disorders (patient states that he thinks he has bipolar disorder and schizophrenia) who presents after an intentional overdose.  The patient states that he has had hot flashes, runny nose and sore throat for months and thinks he has been spreading coronavirus for months.  He drank half of a bottle of Chlorox because he feels responsible for the pandemic.  The patient started to vomit (stated it was red and black) at which point his roommate called 911.  Upon arrival to the ED, vital signs were /77, , temperature 97.8 degrees Farenheit and saturating 100% on room air.  Labs were signifcant for Na 150, Cl 110, bicaronate 21, anion gap 19, BUN 27.  He received 1L NS, 4mg IV zofran, 40mg IV protnix and was started on a protonix drip at which point ICU was consulted.      REVIEW OF SYSTEMS:    CONSTITUTIONAL: No weakness, fevers or chills  EYES/ENT: No visual changes;  No vertigo or throat pain   NECK: No pain or stiffness  RESPIRATORY: No cough, wheezing, hemoptysis; No shortness of breath  CARDIOVASCULAR: No chest pain or palpitations  GASTROINTESTINAL: No abdominal or epigastric pain. No nausea, vomiting, or hematemesis; No diarrhea or constipation. No melena or hematochezia.  GENITOURINARY: No dysuria, frequency or hematuria  NEUROLOGICAL: No numbness or weakness  SKIN: No itching, burning, rashes, or lesions   All other review of systems is negative unless indicated above.      PMH: Depression    PSH:    FH:    SH:    ALLERGIES:    MEDICATIONS:    VITAL SIGNS:  ICU Vital Signs Last 24 Hrs  T(C): 36.6 (24 Mar 2020 04:49), Max: 36.6 (24 Mar 2020 04:49)  T(F): 97.8 (24 Mar 2020 04:49), Max: 97.8 (24 Mar 2020 04:49)  HR: 108 (24 Mar 2020 04:49) (108 - 108)  BP: 123/77 (24 Mar 2020 04:49) (123/77 - 123/77)  BP(mean): --  ABP: --  ABP(mean): --  RR: 20 (24 Mar 2020 04:49) (20 - 20)  SpO2: 100% (24 Mar 2020 04:49) (100% - 100%)    CAPILLARY BLOOD GLUCOSE          PHYSICAL EXAM:  Constitutional: resting comfortably in bed, NAD  HEENT: NC/AT; PERRL, anicteric sclera; no oropharyngeal erythema or exudates; MMM  Neck: supple, no appreciable JVD  Respiratory: CTA B/L, no W/R/R; respirations appear non-labored, conversive in full sentences  Cardiovascular: +S1/S2, RRR  Gastrointestinal: abdomen soft, NT/ND  Extremities: WWP; no clubbing, cyanosis or edema  Vascular: 2+ radial, femoral, and DP/PT pulses B/L  Dermatologic: skin normal color and turgor; no visible rashes  Neurological:     LABS:                        15.8   9.48  )-----------( 212      ( 24 Mar 2020 05:15 )             49.6     03-24    150<H>  |  110<H>  |  27<H>  ----------------------------<  200<H>  4.2   |  21<L>  |  1.21    Ca    9.8      24 Mar 2020 05:15    TPro  7.5  /  Alb  4.4  /  TBili  0.8  /  DBili  x   /  AST  30  /  ALT  17  /  AlkPhos  50  03-24    PT/INR - ( 24 Mar 2020 05:15 )   PT: 11.6 sec;   INR: 1.02          PTT - ( 24 Mar 2020 05:15 )  PTT:21.7 sec              EKG: Reviewed.    RADIOLOGY & ADDITIONAL TESTS: Reviewed. [facility] SERVICE CONSULTATION NOTE    CC: Suicide attempt    HPI: 70 year old male with PMH psych disorders (patient states that he thinks he has bipolar disorder and schizophrenia) who presents after an intentional overdose.  The patient states that he has had hot flashes, runny nose and sore throat for months and thinks he has been spreading coronavirus for months.  He drank half of a bottle of Chlorox because he feels responsible for the pandemic.  The patient started to vomit (stated it was red and black) at which point his roommate called 911.  Upon arrival to the ED, vital signs were /77, , temperature 97.8 degrees Farenheit and saturating 100% on room air.  Labs were signifcant for Na 150, Cl 110, bicaronate 21, anion gap 19, BUN 27.  He received 1L NS, 4mg IV zofran, 40mg IV protnix and was started on a protonix drip at which point ICU was consulted.      REVIEW OF SYSTEMS:    CONSTITUTIONAL: No weakness, fevers or chills, endorses hot flashes  EYES/ENT: No visual changes;  Endorses sore throat and rhinorrhea  NECK: No pain or stiffness  RESPIRATORY: No cough, wheezing, hemoptysis; No shortness of breath  CARDIOVASCULAR: No chest pain or palpitations  GASTROINTESTINAL: No abdominal or epigastric pain. Endorses vomiting; No diarrhea or constipation. No melena or hematochezia.  GENITOURINARY: No dysuria, frequency or hematuria  NEUROLOGICAL: No numbness or weakness  SKIN: No itching, burning, rashes, or lesions   All other review of systems is negative unless indicated above.      PMH: Depression, bipolar, schizophrenia    PSH: Multiple hernia repairs, knee replacements    FH: Father was paranoid schizophrenia, sister has bipolar disorder    SH: Denies tobacco or drug use, endorses infrequent alcohol use    ALLERGIES: Codeine-causes upset stomach    MEDICATIONS: Lexapro-does not know other medications    VITAL SIGNS:  ICU Vital Signs Last 24 Hrs  T(C): 36.6 (24 Mar 2020 04:49), Max: 36.6 (24 Mar 2020 04:49)  T(F): 97.8 (24 Mar 2020 04:49), Max: 97.8 (24 Mar 2020 04:49)  HR: 108 (24 Mar 2020 04:49) (108 - 108)  BP: 123/77 (24 Mar 2020 04:49) (123/77 - 123/77)  BP(mean): --  ABP: --  ABP(mean): --  RR: 20 (24 Mar 2020 04:49) (20 - 20)  SpO2: 100% (24 Mar 2020 04:49) (100% - 100%)    CAPILLARY BLOOD GLUCOSE          PHYSICAL EXAM:  Constitutional: Elderly male resting comfortably in bed, NAD  HEENT: NC/AT; very dry mucous membranes  Neck: supple, no appreciable JVD  Respiratory: CTA B/L, no W/R/R; respirations appear non-labored, conversive in full sentences  Cardiovascular: +S1/S2, RRR, no murmurs/rubs/gallops  Gastrointestinal: abdomen soft, NT/ND  Extremities: WWP; no clubbing, cyanosis or edema  Vascular: 2+ radial, femoral, and DP/PT pulses B/L  Dermatologic: skin normal color and turgor; no visible rashes  Neurological: AAOx2-3, knows he is at the hospital cannot tell me which one  Psych: Odd affect, fixated on coronavirus    LABS:                        15.8   9.48  )-----------( 212      ( 24 Mar 2020 05:15 )             49.6     03-24    150<H>  |  110<H>  |  27<H>  ----------------------------<  200<H>  4.2   |  21<L>  |  1.21    Ca    9.8      24 Mar 2020 05:15    TPro  7.5  /  Alb  4.4  /  TBili  0.8  /  DBili  x   /  AST  30  /  ALT  17  /  AlkPhos  50  03-24    PT/INR - ( 24 Mar 2020 05:15 )   PT: 11.6 sec;   INR: 1.02          PTT - ( 24 Mar 2020 05:15 )  PTT:21.7 sec              EKG: Reviewed.    RADIOLOGY & ADDITIONAL TESTS: Reviewed.

## 2020-03-24 NOTE — H&P ADULT - PROBLEM SELECTOR PLAN 9
extensive past psych history, unsure of home meds, hx of suicide attempt.  -psych following appreciate recs  - 1 to 1

## 2020-03-24 NOTE — BEHAVIORAL HEALTH ASSESSMENT NOTE - SUMMARY
-start haldol 2mg IV qhs for treatment of psychosis  -restart lexapro 20mg po daily for treatment of depression (unclear if he was on 20mg daily or30mg -higher than FDA approved- to be clarified)  -hold trazodone 50mg po qhs for now 70M with  with unclear PPH, likely bipolar disorder, one prior psychiatric admission s/p SA, presenting to [facility] after hematemesis in the setting of caustic ingestion as a suicide attempt. Patient currently presents with low mood, high anxiety and distress secondary to paranoid delusions (related to thinking that he caused the Covid 19 epidemic).     -start haldol 2mg IV qhs for treatment of psychosis; to be switched in the future, when patient able to tolerate PO, to a second generation antipsychotic; monitor for Qtc prolongation  -when able to tolerate po, restart lexapro 20mg po daily for treatment of depression (unclear if he was on 20mg daily or30mg -higher than FDA approved- to be clarified)  -hold trazodone 50mg po qhs for now  -1:1 observation   -continue to monitor and evaluate for need of psychiatric admission after medically cleared  -CL to continue to follow

## 2020-03-24 NOTE — ED PROVIDER NOTE - DIAGNOSTIC INTERPRETATION
ER Physician: Jocelyn Aguilar   CHEST XRAY INTERPRETATION: lungs clear, heart shadow normal, bony structures intact

## 2020-03-25 ENCOUNTER — TRANSCRIPTION ENCOUNTER (OUTPATIENT)
Age: 70
End: 2020-03-25

## 2020-03-25 LAB
ANION GAP SERPL CALC-SCNC: 11 MMOL/L — SIGNIFICANT CHANGE UP (ref 5–17)
ANION GAP SERPL CALC-SCNC: 12 MMOL/L — SIGNIFICANT CHANGE UP (ref 5–17)
BUN SERPL-MCNC: 30 MG/DL — HIGH (ref 7–23)
BUN SERPL-MCNC: 34 MG/DL — HIGH (ref 7–23)
CALCIUM SERPL-MCNC: 8.9 MG/DL — SIGNIFICANT CHANGE UP (ref 8.4–10.5)
CALCIUM SERPL-MCNC: 9.5 MG/DL — SIGNIFICANT CHANGE UP (ref 8.4–10.5)
CHLORIDE SERPL-SCNC: 111 MMOL/L — HIGH (ref 96–108)
CHLORIDE SERPL-SCNC: 114 MMOL/L — HIGH (ref 96–108)
CO2 SERPL-SCNC: 26 MMOL/L — SIGNIFICANT CHANGE UP (ref 22–31)
CO2 SERPL-SCNC: 27 MMOL/L — SIGNIFICANT CHANGE UP (ref 22–31)
CREAT SERPL-MCNC: 1.12 MG/DL — SIGNIFICANT CHANGE UP (ref 0.5–1.3)
CREAT SERPL-MCNC: 1.24 MG/DL — SIGNIFICANT CHANGE UP (ref 0.5–1.3)
GLUCOSE SERPL-MCNC: 116 MG/DL — HIGH (ref 70–99)
GLUCOSE SERPL-MCNC: 138 MG/DL — HIGH (ref 70–99)
HCT VFR BLD CALC: 48.2 % — SIGNIFICANT CHANGE UP (ref 39–50)
HCV AB S/CO SERPL IA: 2.77 S/CO — SIGNIFICANT CHANGE UP
HCV AB SERPL-IMP: ABNORMAL
HGB BLD-MCNC: 15.4 G/DL — SIGNIFICANT CHANGE UP (ref 13–17)
MAGNESIUM SERPL-MCNC: 2.2 MG/DL — SIGNIFICANT CHANGE UP (ref 1.6–2.6)
MCHC RBC-ENTMCNC: 30 PG — SIGNIFICANT CHANGE UP (ref 27–34)
MCHC RBC-ENTMCNC: 32 GM/DL — SIGNIFICANT CHANGE UP (ref 32–36)
MCV RBC AUTO: 93.8 FL — SIGNIFICANT CHANGE UP (ref 80–100)
NRBC # BLD: 0 /100 WBCS — SIGNIFICANT CHANGE UP (ref 0–0)
PLATELET # BLD AUTO: 181 K/UL — SIGNIFICANT CHANGE UP (ref 150–400)
POTASSIUM SERPL-MCNC: 3.4 MMOL/L — LOW (ref 3.5–5.3)
POTASSIUM SERPL-MCNC: 4.2 MMOL/L — SIGNIFICANT CHANGE UP (ref 3.5–5.3)
POTASSIUM SERPL-SCNC: 3.4 MMOL/L — LOW (ref 3.5–5.3)
POTASSIUM SERPL-SCNC: 4.2 MMOL/L — SIGNIFICANT CHANGE UP (ref 3.5–5.3)
RBC # BLD: 5.14 M/UL — SIGNIFICANT CHANGE UP (ref 4.2–5.8)
RBC # FLD: 14.6 % — HIGH (ref 10.3–14.5)
SODIUM SERPL-SCNC: 148 MMOL/L — HIGH (ref 135–145)
SODIUM SERPL-SCNC: 153 MMOL/L — HIGH (ref 135–145)
WBC # BLD: 20.08 K/UL — HIGH (ref 3.8–10.5)
WBC # FLD AUTO: 20.08 K/UL — HIGH (ref 3.8–10.5)

## 2020-03-25 PROCEDURE — 99233 SBSQ HOSP IP/OBS HIGH 50: CPT

## 2020-03-25 PROCEDURE — 99233 SBSQ HOSP IP/OBS HIGH 50: CPT | Mod: GC

## 2020-03-25 PROCEDURE — 99223 1ST HOSP IP/OBS HIGH 75: CPT

## 2020-03-25 RX ORDER — SODIUM CHLORIDE 9 MG/ML
1000 INJECTION, SOLUTION INTRAVENOUS
Refills: 0 | Status: DISCONTINUED | OUTPATIENT
Start: 2020-03-25 | End: 2020-03-25

## 2020-03-25 RX ORDER — ESCITALOPRAM OXALATE 10 MG/1
20 TABLET, FILM COATED ORAL DAILY
Refills: 0 | Status: DISCONTINUED | OUTPATIENT
Start: 2020-03-25 | End: 2020-03-27

## 2020-03-25 RX ORDER — SODIUM CHLORIDE 9 MG/ML
1000 INJECTION, SOLUTION INTRAVENOUS
Refills: 0 | Status: DISCONTINUED | OUTPATIENT
Start: 2020-03-25 | End: 2020-03-26

## 2020-03-25 RX ORDER — TRAZODONE HCL 50 MG
1 TABLET ORAL
Qty: 0 | Refills: 0 | DISCHARGE

## 2020-03-25 RX ORDER — POTASSIUM CHLORIDE 20 MEQ
40 PACKET (EA) ORAL EVERY 4 HOURS
Refills: 0 | Status: COMPLETED | OUTPATIENT
Start: 2020-03-25 | End: 2020-03-25

## 2020-03-25 RX ADMIN — Medication 200 MILLIGRAM(S): at 00:56

## 2020-03-25 RX ADMIN — PANTOPRAZOLE SODIUM 40 MILLIGRAM(S): 20 TABLET, DELAYED RELEASE ORAL at 19:34

## 2020-03-25 RX ADMIN — ESCITALOPRAM OXALATE 20 MILLIGRAM(S): 10 TABLET, FILM COATED ORAL at 14:10

## 2020-03-25 RX ADMIN — SODIUM CHLORIDE 120 MILLILITER(S): 9 INJECTION, SOLUTION INTRAVENOUS at 17:30

## 2020-03-25 RX ADMIN — Medication 40 MILLIEQUIVALENT(S): at 22:16

## 2020-03-25 RX ADMIN — Medication 40 MILLIEQUIVALENT(S): at 17:46

## 2020-03-25 RX ADMIN — SODIUM CHLORIDE 140 MILLILITER(S): 9 INJECTION, SOLUTION INTRAVENOUS at 10:29

## 2020-03-25 RX ADMIN — PANTOPRAZOLE SODIUM 40 MILLIGRAM(S): 20 TABLET, DELAYED RELEASE ORAL at 07:15

## 2020-03-25 RX ADMIN — SODIUM CHLORIDE 120 MILLILITER(S): 9 INJECTION, SOLUTION INTRAVENOUS at 09:19

## 2020-03-25 NOTE — PROGRESS NOTE ADULT - ATTENDING COMMENTS
Doing a bit better than yesterday, tolerating full liquid diet, will advance as tolerated  Apprec GI and Psych recs  D5W for hypernatremia, f/up Na  Leukocytosis likely due to stress/aspiration pneumonitis/dehydration  F/up azotemia  PT consult  Plan for patient to go to Psych once hypernatremia has improved  Rest as above Doing a bit better than yesterday, tolerating full liquid diet, will advance as tolerated  Apprec GI and Psych recs  D5W for hypernatremia, f/up Na  Leukocytosis likely due to stress/aspiration pneumonitis/dehydration  F/up azotemia  Hep C weakly reactive --> f/up RNA  PT consult  Plan for patient to go to Psych once hypernatremia has improved  Rest as above

## 2020-03-25 NOTE — DISCHARGE NOTE PROVIDER - CARE PROVIDER_API CALL
Karl Thomas (MD)  Gastroenterology; Internal Medicine  [address] [place] [address]  Phone: [phone]  Fax: [phone]  Follow Up Time:

## 2020-03-25 NOTE — PROGRESS NOTE ADULT - PROBLEM SELECTOR PLAN 1
Resolved    Patient with episodes of emesis this am in the setting of caustic ingestion with bleach. Emesis noted to be black and bloody patient unable to quantify. Did not attempt to tolerate PO. Case discussed with [facility] poison control center who advised monitoring of HD status and GI consult. Bleed likely UGI 2/2 to caustic ingestion with bleach  -  c/w protonix 40 mg BID  - GI following  - maintain 2 large bore IV access, monitor HD  - Type and screen

## 2020-03-25 NOTE — DISCHARGE NOTE PROVIDER - NSDCCPCAREPLAN_GEN_ALL_CORE_FT
PRINCIPAL DISCHARGE DIAGNOSIS  Diagnosis: Bleach ingestion  Assessment and Plan of Treatment: You presented after an attempt to commit suicide with bleach. We consulted gastroenterology to come and evaluate you. Given your stable vital signs and also imaging performed in the hospital just showing esophageal irritation you did not warrant any endoscopic intervention. We started you on protonix ( an anti acid medication) twice a day PRINCIPAL DISCHARGE DIAGNOSIS  Diagnosis: Ingestion of caustic substance  Assessment and Plan of Treatment: You presented after an attempt to commit suicide with bleach. We consulted gastroenterology to come and evaluate you. Given your stable vital signs and also imaging performed in the hospital just showing esophageal irritation you did not warrant any endoscopic intervention. We started you on protonix ( an anti acid medication) twice a day. You will require this medication for sometime, and must follow up with gastroenterology. Please continue to eat and drink as much as possible. It is important to advance your diet slowly. We have had you only a liquid diet while here in the hospital. Please advance to solids as you feel comfortable.      SECONDARY DISCHARGE DIAGNOSES  Diagnosis: Major depression  Assessment and Plan of Treatment: You presented after a sucide attempt due to a variety extraneous causes. Psychiatry saw you in the hospital and recommend you stay in the hospital for a longer duration for monitoring and symptom control. please continue to take your lexapro 20 mg PRINCIPAL DISCHARGE DIAGNOSIS  Diagnosis: Ingestion of caustic substance  Assessment and Plan of Treatment: You presented after an attempt to commit suicide with bleach. We consulted gastroenterology to come and evaluate you. Given your stable vital signs and also imaging performed in the hospital just showing esophageal irritation you did not warrant any endoscopic intervention. We started you on protonix ( an anti acid medication) twice a day. You will require this medication for sometime, and must follow up with gastroenterology. Please continue to eat and drink as much as possible. It is important to advance your diet slowly. We have had you only a liquid diet while here in the hospital. Please advance to solids as you feel comfortable. Please avoid swallowing any more chemicals or coins, as next time effects could more catastrophic including organ rupture.      SECONDARY DISCHARGE DIAGNOSES  Diagnosis: Major depression  Assessment and Plan of Treatment: You presented after a sucide attempt due to a variety extraneous causes. Psychiatry saw you in the hospital and recommend you stay in the hospital for a longer duration for monitoring and symptom control. please continue to take your lexapro 20 mg and abilify 10 mg. These are both take once a day.

## 2020-03-25 NOTE — PROGRESS NOTE ADULT - PROBLEM SELECTOR PLAN 2
Discussed with poison control center, patient at risk for laryngeal edema and GI perforation. Currently patient speaking in full sentences, no stridor appreciated over upper airway on exam, no crepitus over chest wall, no evidence of free air on imaging. CT consistent with esophagitis.  - GI following-->advance diet slowly (transferred from clears to full liquid)  -will resume PO meds

## 2020-03-25 NOTE — DISCHARGE NOTE PROVIDER - NSDCMRMEDTOKEN_GEN_ALL_CORE_FT
Lexapro:   traZODone 50 mg oral tablet: 1 tab(s) orally 2 times a day escitalopram 20 mg oral tablet: 1 tab(s) orally once a day  pantoprazole 40 mg intravenous injection: 40 milligram(s) intravenous every 12 hours  traZODone 50 mg oral tablet: 1 tab(s) orally 2 times a day  trimethobenzamide 100 mg/mL intramuscular solution: 2 milliliter(s) intramuscular every 6 hours, As needed, Nausea

## 2020-03-25 NOTE — DISCHARGE NOTE PROVIDER - CARE PROVIDERS DIRECT ADDRESSES
[email or web address]

## 2020-03-25 NOTE — PROGRESS NOTE BEHAVIORAL HEALTH - NSBHCONSULTRECOMMENDOTHER_PSY_A_CORE FT
-start haldol 2mg IV qhs for treatment of psychosis; to be switched in the future, when patient able to tolerate PO, to a second generation antipsychotic; monitor for Qtc prolongation  -when able to tolerate po, restart lexapro 20mg po daily for treatment of depression (unclear if he was on 20mg daily or30mg -higher than FDA approved- to be clarified)  -hold trazodone 50mg po qhs for now  -1:1 observation   -Will need psychiatric admission. Have let Dr. Sheikh, unit chief of [facility] know about patient.  -Obtain PT consult   -CL to continue to follow

## 2020-03-25 NOTE — PROGRESS NOTE ADULT - SUBJECTIVE AND OBJECTIVE BOX
Patient examined at the bedside, with no acute events overnight. States that he is still nauseas and has had multiple symptoms of depression for quite sometime. Patient endorses nausea. denies fever, chills, vision changes, HA, vomiting, diarrhea/ constipation, dysuria/ frequency/ urgency.    T(C): 36.4 (03-25-20 @ 05:26), Max: 37.2 (03-24-20 @ 09:04)  HR: 94 (03-25-20 @ 05:26) (71 - 95)  BP: 154/93 (03-25-20 @ 05:26) (145/87 - 179/90)  RR: 18 (03-25-20 @ 05:26) (16 - 19)  SpO2: 98% (03-25-20 @ 05:26) (97% - 100%)  Wt(kg): --Vital Signs Last 24 Hrs      Review of Systems:  -All other ROS negative, except those noted in HPI    PHYSICAL EXAM:  GENERAL: NAD, obese  HEAD:  Atraumatic, Normocephalic  EYES: EOMI, PERRLA, conjunctiva and sclera clear  ENMT: slight tonsillar erythema, Moist mucous membranes, Good dentition, No lesions  NECK: Supple, No JVD  NERVOUS SYSTEM:  Alert & Oriented X3, Good concentration  CHEST/LUNG: Clear to percussion bilaterally; No rales, rhonchi, wheezing, or rubs  HEART: Regular rate and rhythm; No murmurs, rubs, or gallops  ABDOMEN: Soft, Nontender, Nondistended; Bowel sounds present  EXTREMITIES:  2+ Peripheral Pulses, No clubbing, cyanosis, or edema  LYMPH: No lymphadenopathy noted  SKIN: Diffuse area of erythema noted on back and buttocks with no skin break down, no bullae, no pustules.    lactated ringers. 1000 milliLiter(s) IV Continuous <Continuous>  pantoprazole  Injectable 40 milliGRAM(s) IV Push every 12 hours  trimethobenzamide Injectable 200 milliGRAM(s) IntraMuscular every 6 hours PRN      LABS:                        15.6   19.03 )-----------( 195      ( 24 Mar 2020 19:07 )             49.4     03-24    151<H>  |  115<H>  |  34<H>  ----------------------------<  166<H>  4.1   |  20<L>  |  1.10    Ca    9.1      24 Mar 2020 19:07    TPro  7.5  /  Alb  4.4  /  TBili  0.8  /  DBili  x   /  AST  30  /  ALT  17  /  AlkPhos  50  03-24    PT/INR - ( 24 Mar 2020 05:15 )   PT: 11.6 sec;   INR: 1.02          PTT - ( 24 Mar 2020 05:15 )  PTT:21.7 sec  Urinalysis Basic - ( 24 Mar 2020 07:09 )    Color: Yellow / Appearance: Clear / SG: >=1.030 / pH: x  Gluc: x / Ketone: 15 mg/dL  / Bili: Negative / Urobili: 0.2 E.U./dL   Blood: x / Protein: Trace mg/dL / Nitrite: NEGATIVE   Leuk Esterase: NEGATIVE / RBC: < 5 /HPF / WBC < 5 /HPF   Sq Epi: x / Non Sq Epi: 0-5 /HPF / Bacteria: Present /HPF      CAPILLARY BLOOD GLUCOSE            Urinalysis Basic - ( 24 Mar 2020 07:09 )    Color: Yellow / Appearance: Clear / SG: >=1.030 / pH: x  Gluc: x / Ketone: 15 mg/dL  / Bili: Negative / Urobili: 0.2 E.U./dL   Blood: x / Protein: Trace mg/dL / Nitrite: NEGATIVE   Leuk Esterase: NEGATIVE / RBC: < 5 /HPF / WBC < 5 /HPF   Sq Epi: x / Non Sq Epi: 0-5 /HPF / Bacteria: Present /HPF

## 2020-03-25 NOTE — PROGRESS NOTE BEHAVIORAL HEALTH - NSBHADMITCOUNSELOTHER_PSY_A_CORE FT
Discussed circumstances that led to suicide attempt and how he has felt since, discussed his feelings regarding inpatient psychiatric admission.

## 2020-03-25 NOTE — DISCHARGE NOTE PROVIDER - HOSPITAL COURSE
70M with extensive psych history now presents from home after hematemesis in the setting of caustic ingestion of bleach.        Hematemesis    -resolved at home    - 70M with extensive psych history now presents from home after hematemesis in the setting of caustic ingestion of bleach.        Hematemesis/caustic ingestion    -resolved at home    -GI was immediately consulted regarding caustic ingestion and possible endoscopic intervention. Given patient stability and CT showing esophageal wall thickening and no perforation. GI did not feel patient warranted immediate endoscopic intervention    -patient initially NPO and advanced to clears then full liquid diet. Goal to slowly advance diet back     -placed on PPI BID        Depression/ SI    -patient with complicated psych hx who presented after drinking bleach when he thought he started the covid pandemic and also thought his partner may have falledn very ill. Patient has multiple suicidal attempts in the past and previous hospitalizations for this.     -psych was consulted and patient was placed on 1 to 1    -psych believe this may be bipolar disorder along with depression    -c/w lexapro 20 mg    -will be transferred to psych         Hypernatremia    -patient noted to be hypernatremic (as high as 153) during admission which likely 2/2 to sodium hypochlorite which is main component of bleach and known to cause metabolic acidosis/ hypernatremia. Patient placed on fluids during course of stay. With Na downtrending appropriately        Metabolic acidosis    -present on admission in setting of bleach ingestion, resolved        Outpatient labs: BMP    Outpatient follow up: GI, psych 70M with extensive psych history now presents from home after hematemesis in the setting of caustic ingestion of bleach.        Hematemesis/caustic ingestion    -resolved at home    -GI was immediately consulted regarding caustic ingestion and possible endoscopic intervention. Given patient stability and CT showing esophageal wall thickening and no perforation. GI did not feel patient warranted immediate endoscopic intervention    -patient initially NPO and advanced to clears then full liquid diet. Goal to slowly advance diet back     -placed on PPI BID        Ingestion of coins    -patient admitted to swallowing coins prior to bleech    -abdominal xray on 3/26 showed coins in the ascending colon, given 2 L golytely    -3/27 xray showed unchanged position of the coin, given another 2 L golytely    -continue to monitor BM    -get daily abdominal xray to monitor movement of coins        Depression/ SI    -patient with complicated psych hx who presented after drinking bleach when he thought he started the covid pandemic and also thought his partner may have falledn very ill. Patient has multiple suicidal attempts in the past and previous hospitalizations for this.     -psych was consulted and patient was placed on 1 to 1    -psych believe this may be bipolar disorder along with depression    -c/w lexapro 20 mg and abilify 10 mg, Qtc 456 on 3/27    -will be transferred to psych         Hypernatremia    -patient noted to be hypernatremic (as high as 153) during admission which likely 2/2 to sodium hypochlorite which is main component of bleach and known to cause metabolic acidosis/ hypernatremia. Patient placed on fluids during course of stay. With Na downtrending appropriately        Metabolic acidosis    -present on admission in setting of bleach ingestion, resolved            Outpatient labs: [facility]    Outpatient follow up: GI, psych

## 2020-03-25 NOTE — DISCHARGE NOTE PROVIDER - NSDCFUSCHEDAPPT_GEN_ALL_CORE_FT
SUMIT ROSADO ; 05/11/2020 ; [facility] [address] [place] [address] SUMIT ROSADO ; 03/27/2020 ; [facility]  SUMIT ROSADO ; 05/11/2020 ; [facility] [address]

## 2020-03-25 NOTE — PROGRESS NOTE ADULT - SUBJECTIVE AND OBJECTIVE BOX
GASTROENTEROLOGY PROGRESS NOTE  Patient seen and examined at bedside. Patient tolerated clear liquids overnight without nausea, vomiting, dysphagia or odynophagia. No chest pain or abdominal pain, no fevers or chills.    PERTINENT REVIEW OF SYSTEMS:  CONSTITUTIONAL: No weakness, fevers or chills  HEENT: No visual changes; No vertigo or throat pain   GASTROINTESTINAL: No abdominal or epigastric pain. No nausea, vomiting, or hematemesis; No diarrhea or constipation. No melena or hematochezia.  NEUROLOGICAL: No numbness or weakness  SKIN: No itching, burning, rashes, or lesions     Allergies    No Known Allergies    Intolerances      MEDICATIONS:  MEDICATIONS  (STANDING):  escitalopram 20 milliGRAM(s) Oral daily  lactated ringers. 1000 milliLiter(s) (120 mL/Hr) IV Continuous <Continuous>  pantoprazole  Injectable 40 milliGRAM(s) IV Push every 12 hours    MEDICATIONS  (PRN):  trimethobenzamide Injectable 200 milliGRAM(s) IntraMuscular every 6 hours PRN Nausea    Vital Signs Last 24 Hrs  T(C): 36.4 (25 Mar 2020 05:26), Max: 36.7 (24 Mar 2020 09:33)  T(F): 97.6 (25 Mar 2020 05:26), Max: 98.1 (24 Mar 2020 10:33)  HR: 94 (25 Mar 2020 05:26) (71 - 95)  BP: 154/93 (25 Mar 2020 05:26) (145/87 - 179/90)  BP(mean): --  RR: 18 (25 Mar 2020 05:26) (16 - 19)  SpO2: 98% (25 Mar 2020 05:26) (97% - 100%)    03-24 @ 07:01  -  03-25 @ 07:00  --------------------------------------------------------  IN: 1040 mL / OUT: 0 mL / NET: 1040 mL      PHYSICAL EXAM:    General: Well developed; well nourished; in no acute distress  HEENT: MMM, conjunctiva and sclera clear  Gastrointestinal: Soft non-tender non-distended; Normal bowel sounds; No hepatosplenomegaly. No rebound or guarding  Skin: Warm and dry. No obvious rash    LABS:                        15.4   20.08 )-----------( 181      ( 25 Mar 2020 07:46 )             48.2     03-25    153<H>  |  114<H>  |  34<H>  ----------------------------<  138<H>  4.2   |  27  |  1.24    Ca    9.5      25 Mar 2020 07:46  Mg     2.2     03-25    TPro  7.5  /  Alb  4.4  /  TBili  0.8  /  DBili  x   /  AST  30  /  ALT  17  /  AlkPhos  50  03-24    PT/INR - ( 24 Mar 2020 05:15 )   PT: 11.6 sec;   INR: 1.02          PTT - ( 24 Mar 2020 05:15 )  PTT:21.7 sec      Urinalysis Basic - ( 24 Mar 2020 07:09 )    Color: Yellow / Appearance: Clear / SG: >=1.030 / pH: x  Gluc: x / Ketone: 15 mg/dL  / Bili: Negative / Urobili: 0.2 E.U./dL   Blood: x / Protein: Trace mg/dL / Nitrite: NEGATIVE   Leuk Esterase: NEGATIVE / RBC: < 5 /HPF / WBC < 5 /HPF   Sq Epi: x / Non Sq Epi: 0-5 /HPF / Bacteria: Present /HPF                RADIOLOGY & ADDITIONAL STUDIES:  Reviewed

## 2020-03-25 NOTE — PROGRESS NOTE ADULT - ASSESSMENT
70 year old male with PMH psych disorders (patient states that he thinks he has bipolar disorder and schizophrenia) who presents after an intentional ingestion of bleach    1. Caustic ingestion: household bleach - Patient remains stable without any airway compromise, no vomiting, abdominal pain, or melena. CT shows esophageal wall thickening without perforation. Per literature review household bleach has a pH around 10-11 which is unlikely to cause significant or penetrating mucosal injury with the majority of patients recovering well and without complication.  - Advance diet as tolerated  - Continue PPI BID, Standing Zofran  - No indication for urgent endoscopic evaluation at this time    2. HCV Ab weakly reactive - Patient may have been exposed to HCV in the past.   - f/u HCV RNA reflex    Recommendations discussed with primary team  Case discussed with attending physician  Please recall as needed with any gastroenterological questions  Please ensure patient has a follow-up appointment with Dr. Dilan Campo [phone] 70 year old male with PMH psych disorders (patient states that he thinks he has bipolar disorder and schizophrenia) who presents after an intentional ingestion of bleach    1. Caustic ingestion: household bleach - Patient remains stable without any airway compromise, no vomiting, abdominal pain, or melena. CT shows esophageal wall thickening without perforation. Per literature review household bleach has a pH around 10-11 which is unlikely to cause significant or penetrating mucosal injury with the majority of patients recovering well and without complication.  - Advance diet as tolerated  - Continue PPI BID, Standing Zofran  - No indication for urgent endoscopic evaluation at this time    2. HCV Ab weakly reactive - Patient may have been exposed to HCV in the past.   - f/u HCV RNA reflex    Recommendations discussed with primary team  Case discussed with attending physician  Please recall as needed with any gastroenterological questions

## 2020-03-25 NOTE — PROGRESS NOTE ADULT - PROBLEM SELECTOR PLAN 8
extensive past psych history, unsure of home meds, hx of suicide attempt.  -psych following appreciate recs-->will resume PO lexapro 20 mg   - 1 to 1
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
## 2020-03-26 PROBLEM — F32.9 MAJOR DEPRESSIVE DISORDER, SINGLE EPISODE, UNSPECIFIED: Chronic | Status: ACTIVE | Noted: 2020-03-24

## 2020-03-26 PROBLEM — F20.9 SCHIZOPHRENIA, UNSPECIFIED: Chronic | Status: ACTIVE | Noted: 2020-03-24

## 2020-03-26 PROBLEM — F31.9 BIPOLAR DISORDER, UNSPECIFIED: Chronic | Status: ACTIVE | Noted: 2020-03-24

## 2020-03-26 LAB
ANION GAP SERPL CALC-SCNC: 11 MMOL/L — SIGNIFICANT CHANGE UP (ref 5–17)
ANION GAP SERPL CALC-SCNC: 15 MMOL/L — SIGNIFICANT CHANGE UP (ref 5–17)
BASOPHILS # BLD AUTO: 0.01 K/UL — SIGNIFICANT CHANGE UP (ref 0–0.2)
BASOPHILS NFR BLD AUTO: 0.1 % — SIGNIFICANT CHANGE UP (ref 0–2)
BUN SERPL-MCNC: 24 MG/DL — HIGH (ref 7–23)
BUN SERPL-MCNC: 28 MG/DL — HIGH (ref 7–23)
CALCIUM SERPL-MCNC: 8.7 MG/DL — SIGNIFICANT CHANGE UP (ref 8.4–10.5)
CALCIUM SERPL-MCNC: 9 MG/DL — SIGNIFICANT CHANGE UP (ref 8.4–10.5)
CHLORIDE SERPL-SCNC: 111 MMOL/L — HIGH (ref 96–108)
CHLORIDE SERPL-SCNC: 113 MMOL/L — HIGH (ref 96–108)
CO2 SERPL-SCNC: 22 MMOL/L — SIGNIFICANT CHANGE UP (ref 22–31)
CO2 SERPL-SCNC: 27 MMOL/L — SIGNIFICANT CHANGE UP (ref 22–31)
CREAT SERPL-MCNC: 0.96 MG/DL — SIGNIFICANT CHANGE UP (ref 0.5–1.3)
CREAT SERPL-MCNC: 1.07 MG/DL — SIGNIFICANT CHANGE UP (ref 0.5–1.3)
EOSINOPHIL # BLD AUTO: 0 K/UL — SIGNIFICANT CHANGE UP (ref 0–0.5)
EOSINOPHIL NFR BLD AUTO: 0 % — SIGNIFICANT CHANGE UP (ref 0–6)
ETHYLENE GLYCOL SERPLBLD-MCNC: SIGNIFICANT CHANGE UP MG/DL
GLUCOSE SERPL-MCNC: 116 MG/DL — HIGH (ref 70–99)
GLUCOSE SERPL-MCNC: 136 MG/DL — HIGH (ref 70–99)
HCT VFR BLD CALC: 43.1 % — SIGNIFICANT CHANGE UP (ref 39–50)
HGB BLD-MCNC: 14 G/DL — SIGNIFICANT CHANGE UP (ref 13–17)
IMM GRANULOCYTES NFR BLD AUTO: 0.5 % — SIGNIFICANT CHANGE UP (ref 0–1.5)
LYMPHOCYTES # BLD AUTO: 1.03 K/UL — SIGNIFICANT CHANGE UP (ref 1–3.3)
LYMPHOCYTES # BLD AUTO: 8.3 % — LOW (ref 13–44)
MAGNESIUM SERPL-MCNC: 1.8 MG/DL — SIGNIFICANT CHANGE UP (ref 1.6–2.6)
MCHC RBC-ENTMCNC: 30.1 PG — SIGNIFICANT CHANGE UP (ref 27–34)
MCHC RBC-ENTMCNC: 32.5 GM/DL — SIGNIFICANT CHANGE UP (ref 32–36)
MCV RBC AUTO: 92.7 FL — SIGNIFICANT CHANGE UP (ref 80–100)
MONOCYTES # BLD AUTO: 0.88 K/UL — SIGNIFICANT CHANGE UP (ref 0–0.9)
MONOCYTES NFR BLD AUTO: 7.1 % — SIGNIFICANT CHANGE UP (ref 2–14)
NEUTROPHILS # BLD AUTO: 10.36 K/UL — HIGH (ref 1.8–7.4)
NEUTROPHILS NFR BLD AUTO: 84 % — HIGH (ref 43–77)
NRBC # BLD: 0 /100 WBCS — SIGNIFICANT CHANGE UP (ref 0–0)
PLATELET # BLD AUTO: 164 K/UL — SIGNIFICANT CHANGE UP (ref 150–400)
POTASSIUM SERPL-MCNC: 4.3 MMOL/L — SIGNIFICANT CHANGE UP (ref 3.5–5.3)
POTASSIUM SERPL-MCNC: 4.7 MMOL/L — SIGNIFICANT CHANGE UP (ref 3.5–5.3)
POTASSIUM SERPL-SCNC: 4.3 MMOL/L — SIGNIFICANT CHANGE UP (ref 3.5–5.3)
POTASSIUM SERPL-SCNC: 4.7 MMOL/L — SIGNIFICANT CHANGE UP (ref 3.5–5.3)
RBC # BLD: 4.65 M/UL — SIGNIFICANT CHANGE UP (ref 4.2–5.8)
RBC # FLD: 14.6 % — HIGH (ref 10.3–14.5)
SODIUM SERPL-SCNC: 149 MMOL/L — HIGH (ref 135–145)
SODIUM SERPL-SCNC: 150 MMOL/L — HIGH (ref 135–145)
WBC # BLD: 12.34 K/UL — HIGH (ref 3.8–10.5)
WBC # FLD AUTO: 12.34 K/UL — HIGH (ref 3.8–10.5)

## 2020-03-26 PROCEDURE — 99233 SBSQ HOSP IP/OBS HIGH 50: CPT | Mod: GC

## 2020-03-26 PROCEDURE — 74018 RADEX ABDOMEN 1 VIEW: CPT | Mod: 26

## 2020-03-26 PROCEDURE — 71045 X-RAY EXAM CHEST 1 VIEW: CPT | Mod: 26

## 2020-03-26 PROCEDURE — 99233 SBSQ HOSP IP/OBS HIGH 50: CPT

## 2020-03-26 RX ORDER — ESCITALOPRAM OXALATE 10 MG/1
0 TABLET, FILM COATED ORAL
Qty: 0 | Refills: 0 | DISCHARGE

## 2020-03-26 RX ORDER — ARIPIPRAZOLE 15 MG/1
10 TABLET ORAL DAILY
Refills: 0 | Status: DISCONTINUED | OUTPATIENT
Start: 2020-03-26 | End: 2020-03-27

## 2020-03-26 RX ORDER — AMLODIPINE BESYLATE 2.5 MG/1
5 TABLET ORAL DAILY
Refills: 0 | Status: DISCONTINUED | OUTPATIENT
Start: 2020-03-26 | End: 2020-03-27

## 2020-03-26 RX ORDER — ONDANSETRON 8 MG/1
4 TABLET, FILM COATED ORAL EVERY 4 HOURS
Refills: 0 | Status: DISCONTINUED | OUTPATIENT
Start: 2020-03-26 | End: 2020-03-27

## 2020-03-26 RX ORDER — ESCITALOPRAM OXALATE 10 MG/1
1 TABLET, FILM COATED ORAL
Qty: 0 | Refills: 0 | DISCHARGE
Start: 2020-03-26

## 2020-03-26 RX ORDER — SOD SULF/SODIUM/NAHCO3/KCL/PEG
2000 SOLUTION, RECONSTITUTED, ORAL ORAL ONCE
Refills: 0 | Status: COMPLETED | OUTPATIENT
Start: 2020-03-26 | End: 2020-03-26

## 2020-03-26 RX ORDER — ACETAMINOPHEN 500 MG
650 TABLET ORAL EVERY 6 HOURS
Refills: 0 | Status: DISCONTINUED | OUTPATIENT
Start: 2020-03-26 | End: 2020-03-27

## 2020-03-26 RX ORDER — PANTOPRAZOLE SODIUM 20 MG/1
40 TABLET, DELAYED RELEASE ORAL
Qty: 0 | Refills: 0 | DISCHARGE
Start: 2020-03-26

## 2020-03-26 RX ORDER — SODIUM CHLORIDE 9 MG/ML
1000 INJECTION, SOLUTION INTRAVENOUS
Refills: 0 | Status: COMPLETED | OUTPATIENT
Start: 2020-03-26 | End: 2020-03-26

## 2020-03-26 RX ADMIN — PANTOPRAZOLE SODIUM 40 MILLIGRAM(S): 20 TABLET, DELAYED RELEASE ORAL at 06:33

## 2020-03-26 RX ADMIN — Medication 200 MILLIGRAM(S): at 01:19

## 2020-03-26 RX ADMIN — Medication 2000 MILLILITER(S): at 17:27

## 2020-03-26 RX ADMIN — SODIUM CHLORIDE 250 MILLILITER(S): 9 INJECTION, SOLUTION INTRAVENOUS at 11:57

## 2020-03-26 RX ADMIN — AMLODIPINE BESYLATE 5 MILLIGRAM(S): 2.5 TABLET ORAL at 09:24

## 2020-03-26 RX ADMIN — ESCITALOPRAM OXALATE 20 MILLIGRAM(S): 10 TABLET, FILM COATED ORAL at 11:37

## 2020-03-26 RX ADMIN — PANTOPRAZOLE SODIUM 40 MILLIGRAM(S): 20 TABLET, DELAYED RELEASE ORAL at 18:04

## 2020-03-26 RX ADMIN — ARIPIPRAZOLE 10 MILLIGRAM(S): 15 TABLET ORAL at 13:04

## 2020-03-26 NOTE — PROGRESS NOTE ADULT - PROBLEM SELECTOR PLAN 4
likely multifactorial as patient with ingestion of sodium hypochlorite this am, per poison control can cause transient hypernatremia. Additionally, with spec grav 1.030 patient likely dry. Patient asymptomatic, and alert and orient x3 and mentating well now s/p 2L NS in ED.  - Trend BMP  - monitor mental status likely multifactorial as patient with ingestion of sodium hypochlorite this am, per poison control can cause transient hypernatremia.  -d5w maintenance  - Trend BMP  - monitor mental status

## 2020-03-26 NOTE — CONSULT NOTE ADULT - SUBJECTIVE AND OBJECTIVE BOX
Patient is a 70y old  Male who presents with a chief complaint of caustic ingestion and hematemesis (25 Mar 2020 09:12)       HPI:  70M with psych pmhx (?bipolar disorder, ?schizophrenia-with past suicide attempts) presents after hematemesis in the setting of caustic ingestion. Patient has been feeling sad, depressed and guilty for last 4 months in addition to experiencing multiple "hot flashes" and runny nose and has now been concerned that he is responsible for the coronavirus pandemic. Because of the guilt associated with his self blame for covid pandemic (and acute episode of psychosis when he believed his partner had passed away) patient attempted to intentionally overdose this am by drinking half a bottle of bleach while laying down in bathtub. After episode patient experienced multiple episodes of emesis with bloody and black flecks in the vomitus-unable to quantify. Did not attempt PO afterwards. Additionally patient notes bottle of bleach knocked over while laying in bath tub with bleach coming into contact with his back. After episode patient noted coughing but denies light-headedness, CP, sob, stridor, or stomach pain.     ED course:   Vitals: T afebrile, HR , //88, RR 20, SPo2 % on room air  Labs: no leukocytosis (no bands, no lymphopenia), Hgb 15.8, , Na 150, Cl 110, Bicarb 21-gap 19, BUN 27, glucose 200, LFTs WNL, utox negative, acetaminophen level <5,  BAL< 10, salicylate <0.3. Lactate 1.9. Utox negative. UA spec grav 1.030, ketones present, calcium oxylate crystals present, no hematuria. Proteinuria.   Imaging: CT Chest No Cont  1. Diffuse esophageal wall thickening likely esophagitis in setting of causticingestion. No esophageal perforation. No pneumomediastinum.  2. Ground glass opacities seen in the right upper and lower lobe. Differential includes aspiration, edema, hemorrhage, infection.   Meds: 1L NS, 4mg IV zofran, 40mg IV protnix and was started on a protonix drip  Consults: ICU, Psych, GI (24 Mar 2020 08:58)      PAST MEDICAL & SURGICAL HISTORY:  Schizophrenia  Bipolar 1 disorder  Depression  S/P total knee replacement, right  H/O hernia repair      MEDICATIONS  (STANDING):  amLODIPine   Tablet 5 milliGRAM(s) Oral daily  ARIPiprazole 10 milliGRAM(s) Oral daily  escitalopram 20 milliGRAM(s) Oral daily  pantoprazole  Injectable 40 milliGRAM(s) IV Push every 12 hours    MEDICATIONS  (PRN):  acetaminophen   Tablet .. 650 milliGRAM(s) Oral every 6 hours PRN Mild Pain (1-3)  trimethobenzamide Injectable 200 milliGRAM(s) IntraMuscular every 6 hours PRN Nausea      Social History:           -  Home Living Status: lives with his  in an elevator accessible apartment building           -  Prior Home Care Services: none    Baseline Functional Level Prior to Admission:           - ADL's:  states he is independent           - ambulated with a cane prn    FAMILY HISTORY:  FH: schizophrenia      CBC Full  -  ( 26 Mar 2020 07:35 )  WBC Count : 12.34 K/uL  RBC Count : 4.65 M/uL  Hemoglobin : 14.0 g/dL  Hematocrit : 43.1 %  Platelet Count - Automated : 164 K/uL  Mean Cell Volume : 92.7 fl  Mean Cell Hemoglobin : 30.1 pg  Mean Cell Hemoglobin Concentration : 32.5 gm/dL  Auto Neutrophil # : 10.36 K/uL  Auto Lymphocyte # : 1.03 K/uL  Auto Monocyte # : 0.88 K/uL  Auto Eosinophil # : 0.00 K/uL  Auto Basophil # : 0.01 K/uL  Auto Neutrophil % : 84.0 %  Auto Lymphocyte % : 8.3 %  Auto Monocyte % : 7.1 %  Auto Eosinophil % : 0.0 %  Auto Basophil % : 0.1 %      03-26    150<H>  |  113<H>  |  28<H>  ----------------------------<  136<H>  4.3   |  22  |  1.07    Ca    9.0      26 Mar 2020 07:35  Mg     1.8     03-26              Radiology:    < from: Xray Chest 1 View AP/PA (03.24.20 @ 04:57) >    EXAM:  XR CHEST AP OR PA 1V                          PROCEDURE DATE:  03/24/2020          INTERPRETATION:  Portable Chest X-Ray dated 3/24/2020 4:57 AM    Indication: caustic ingestion    Prior studies: None    An AP portable view of the chest reveals no focal infiltrates, pleural effusion or CHF. There may be calcified nodules in the right upper lobe versus end on vessels. Normal heart size. Mediastinum not widened. No pneumothorax. Degenerative changes of the AC joints. Superior migration of the right humeral head compatible with chronic rotator cuff tear.    IMPRESSION:  No focal infiltrates.        < from: CT Chest No Cont (03.24.20 @ 07:45) >  EXAM:  CT CHEST                          PROCEDURE DATE:  03/24/2020          INTERPRETATION:  CT SCAN OF CHEST    History: Caustic ingestion of bleach, rule out perforation. According to EMR, patient reported hematemesis.     Technique: CT scan ofchest performed from lung apices through lung bases. Axial, coronal, and sagittal multiplanar reformatted images were produced. Thin section axial images and axial MIPS were also produced. Intravenous contrast material was not administered, as ordered.    Comparison: None.    Findings:     Lungs and large airways: There are several faint areas of centrilobular groundglass opacities in the right upper lobe -apical and posterior segments and lower lobe-superior segment. Differential diagnosis includes aspiration, edema, hemorrhage, infection.    There are numerous calcified lung granulomas bilaterally.     Pleura:  No pleural effusion.    Mediastinum and hilar regions: No thoracic lymphadenopathy. Small calcified mediastinal and right hilar nodes.    Heart and pericardium:  Heart size is normal. No pericardial effusion.    Vessels:  Severe coronary artery disease. Trace calcified plaque aorta.    Chest wall and lower neck:  Normal.    Upper abdomen: Diffuse thoracic and abdominal esophagealwall thickening likely due to esophagitis from caustic ingestion. No esophageal perforation. No pneumomediastinum.      Bones: Mild degenerative changes.      Impression:  1. Diffuse esophageal wall thickening likely esophagitis in setting of causticingestion. No esophageal perforation. No pneumomediastinum.  2. Ground glass opacities seen in the right upper and lower lobe. Differential includes aspiration, edema, hemorrhage, infection.               Vital Signs Last 24 Hrs  T(C): 36.5 (26 Mar 2020 08:20), Max: 36.8 (25 Mar 2020 21:07)  T(F): 97.7 (26 Mar 2020 08:20), Max: 98.2 (25 Mar 2020 21:07)  HR: 86 (26 Mar 2020 08:20) (83 - 92)  BP: 163/89 (26 Mar 2020 08:20) (153/79 - 180/90)  BP(mean): --  RR: 18 (26 Mar 2020 08:20) (16 - 19)  SpO2: 99% (26 Mar 2020 08:20) (97% - 100%)    REVIEW OF SYSTEMS:    CONSTITUTIONAL: No fever, weight loss, or fatigue  EYES: No eye pain, visual disturbances, or discharge  ENMT:  No difficulty hearing, tinnitus, vertigo; No sinus or throat pain  NECK: No pain or stiffness  BREASTS: No pain, masses, or nipple discharge  RESPIRATORY: No cough, wheezing, chills or hemoptysis; No shortness of breath  CARDIOVASCULAR: No chest pain, palpitations, dizziness, or leg swelling  GASTROINTESTINAL: No abdominal or epigastric pain. No nausea, vomiting, or hematemesis; No diarrhea or constipation. No melena or hematochezia.  GENITOURINARY: No dysuria, frequency, hematuria, or incontinence  NEUROLOGICAL: No headaches, memory loss, loss of strength, numbness, or tremors  SKIN: No itching, burning, rashes, or lesions   LYMPH NODES: No enlarged glands  ENDOCRINE: No heat or cold intolerance; No hair loss  MUSCULOSKELETAL: No joint pain or swelling; No muscle, back, or extremity pain  PSYCHIATRIC: depression, anxiety  HEME/LYMPH: No easy bruising, or bleeding gums  ALLERGY AND IMMUNOLOGIC: No hives or eczema  VASCULAR: no swelling, erythema        Physical Exam: on 1:1 observation obese 69 yo  gentleman lying in semi Seaman's position, feels depressed and anxious    Head: normocephalic, atraumatic    Eyes: PERRLA, EOMI, no nystagmus, sclera anicteric    ENT: nasal discharge, uvula midline, no oropharyngeal erythema/exudate    Neck: supple, negative JVD, negative carotid bruits, no thyromegaly    Chest: CTA bilaterally, neg wheeze/ rhonchi/ rales/ crackles/ egophany    Cardiovascular: regular rate and rhythm, neg murmurs/rubs/gallops    Abdomen: soft, obese, non tender to palpation in all 4 quadrants, negative rebound/guarding, normal bowel sounds    Extremities: WWP, neg cyanosis/clubbing/edema, negative calf tenderness to palpation, negative Rodo's sign      :     Neurologic Exam:      Motor Exam:    Upper Extremities:     RIght:   5/5   /intrinsics               5/5  wrist flexors/extensors               5/5  biceps/triceps               5/5  deltoid               negative drift    Left :    5/5  /intrinsics               5/5  wrist flexors/extensors               5/5 biceps/triceps               5/5 deltoid               negative drift    Lower Extremities:                 Right:   5/5  DF/PF/ evertors/ invertors                5/5  Quad/ hamstrings                5/5  hip flexors/adductors/abductors                 Left:      5/5  DF/PF/ evertors/ invertors                5/5  Quad/ hamstrings                5/5  hip flexors/adductors/abductors                     Sensory:    intact to LT/PP in all UE/LE dermatomes                     DTR:             = biceps/     triceps/     brachioradialis                      = patella/   medial hamstring/ankle                      neg clonus                      neg Babinski                          Gait:  not tested        PM&R Impression:    1) deconditioned  2) no focal weakness    Plan:    1) Physical therapy focusing on therapeutic exercises, bed mobility/transfer out of bed evaluation, progressive ambulation with assistive devices prn.    2) Anticipated Disposition Plan/Recs: d/c home

## 2020-03-26 NOTE — PHYSICAL THERAPY INITIAL EVALUATION ADULT - GENERAL OBSERVATIONS, REHAB EVAL
Pt received supine in bed with +hep-lock, +constant supervision, NAD. Pt left as found, NAD, call bell in reach, +constant supervision. RN awares.
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
## 2020-03-26 NOTE — PHYSICAL THERAPY INITIAL EVALUATION ADULT - ADDITIONAL COMMENTS
Pt lives with partner in an apartment with elevator, denies stair. Prior to admission, pt ambulated independently with cane.

## 2020-03-26 NOTE — PROGRESS NOTE ADULT - PROBLEM SELECTOR PLAN 1
Resolved    Patient with episodes of emesis this am in the setting of caustic ingestion with bleach. Emesis noted to be black and bloody patient unable to quantify. Did not attempt to tolerate PO. Case discussed with [facility] poison control center who advised monitoring of HD status and GI consult. Bleed likely UGI 2/2 to caustic ingestion with bleach  -  c/w protonix 40 mg BID  - GI following  - maintain 2 large bore IV access, monitor HD  - Type and screen

## 2020-03-26 NOTE — PROGRESS NOTE ADULT - SUBJECTIVE AND OBJECTIVE BOX
Patient endorsing nausea and abdominal pain. denies any other ROS.    SUMIT ROSADO  70y  Male    Patient is a 70y old  Male who presents with a chief complaint of caustic ingestion and hematemesis (26 Mar 2020 12:35)      T(C): 37.2 (03-26-20 @ 17:04), Max: 37.2 (03-26-20 @ 17:04)  HR: 78 (03-26-20 @ 17:04) (78 - 92)  BP: 157/69 (03-26-20 @ 17:04) (153/79 - 180/90)  RR: 18 (03-26-20 @ 17:04) (16 - 19)  SpO2: 99% (03-26-20 @ 17:04) (97% - 100%)  Wt(kg): --Vital Signs Last 24 Hrs  T(C): 37.2 (26 Mar 2020 17:04), Max: 37.2 (26 Mar 2020 17:04)  T(F): 99 (26 Mar 2020 17:04), Max: 99 (26 Mar 2020 17:04)  HR: 78 (26 Mar 2020 17:04) (78 - 92)  BP: 157/69 (26 Mar 2020 17:04) (153/79 - 180/90)  BP(mean): --  RR: 18 (26 Mar 2020 17:04) (16 - 19)  SpO2: 99% (26 Mar 2020 17:04) (97% - 100%)    Review of Systems:  -All other ROS negative, except those noted in HPI    PHYSICAL EXAM:  GENERAL: NAD, obese  HEAD:  Atraumatic, Normocephalic  EYES: EOMI, PERRLA, conjunctiva and sclera clear  ENMT: slight tonsillar erythema, Moist mucous membranes, Good dentition, No lesions  NECK: Supple, No JVD  NERVOUS SYSTEM:  Alert & Oriented X3, Good concentration  CHEST/LUNG: Clear to percussion bilaterally; No rales, rhonchi, wheezing, or rubs  HEART: Regular rate and rhythm; No murmurs, rubs, or gallops  ABDOMEN: Soft, Nontender, Nondistended; Bowel sounds present  EXTREMITIES:  2+ Peripheral Pulses, No clubbing, cyanosis, or edema  LYMPH: No lymphadenopathy noted  SKIN: Diffuse area of erythema noted on back and buttocks with no skin break down, no bullae, no pustules.    acetaminophen   Tablet .. 650 milliGRAM(s) Oral every 6 hours PRN  amLODIPine   Tablet 5 milliGRAM(s) Oral daily  ARIPiprazole 10 milliGRAM(s) Oral daily  escitalopram 20 milliGRAM(s) Oral daily  ondansetron Injectable 4 milliGRAM(s) IV Push every 4 hours PRN  pantoprazole  Injectable 40 milliGRAM(s) IV Push every 12 hours      LABS:                        14.0   12.34 )-----------( 164      ( 26 Mar 2020 07:35 )             43.1     03-26    150<H>  |  113<H>  |  28<H>  ----------------------------<  136<H>  4.3   |  22  |  1.07    Ca    9.0      26 Mar 2020 07:35  Mg     1.8     03-26          CAPILLARY BLOOD GLUCOSE

## 2020-03-26 NOTE — PROGRESS NOTE ADULT - ATTENDING COMMENTS
Doing ok, however patient told Nurse manager this morning that he also had ingested a alejandro, later on he told they were like 3-4 dimes but was only able to swallow one or two, AXR showed circular metallic object/objects overlying the ascending colon in the right lower quadrant, possibly 2 pennies. No bowel obstruction. Per GI will give 2L golytely.  Leukocytosis improving  Correcting hyponatremia  Likely transfer to [facility] tomorrow, apprec Psych recs  Rest as above Doing ok, however patient told Nurse manager this morning that he also had ingested a alejandro, later on he told they were like 3-4 dimes but was only able to swallow one or two, AXR showed circular metallic object/objects overlying the ascending colon in the right lower quadrant, possibly 2 pennies. No bowel obstruction. Per GI will give 2L golytely.  Leukocytosis improving  Correcting hypernatremia  Likely transfer to [facility] tomorrow, apprec Psych recs  Rest as above Doing ok, however patient told Nurse manager this morning that he also had ingested a alejandro, later on he told they were like 3-4 dimes but was only able to swallow one or two, AXR showed circular metallic object/objects overlying the ascending colon in the right lower quadrant, possibly 2 pennies. No bowel obstruction. Per GI will give 2L golytely.  Check daily EKG, started on abilify per Psych, QTc on --> however upon our reviewed QTc is 470  Leukocytosis improving  Correcting hypernatremia  Likely transfer to [facility] tomorrow, apprec Psych recs  Starting amlodipine for HTN  Rest as above Doing ok, however patient told Nurse manager this morning that he also had ingested a alejandro, later on he told they were like 3-4 dimes but was only able to swallow one or two, AXR showed circular metallic object/objects overlying the ascending colon in the right lower quadrant, possibly 2 pennies. No bowel obstruction. Per GI will give 2L golytely.  Check daily EKG, started on abilify per Psych, QTc on --> however upon our reviewed QTc is 470  Leukocytosis improving  Correcting hypernatremia  Likely transfer to [facility] tomorrow, apprec Psych recs  Starting amlodipine for HTN  PT --> [facility]  Rest as above Doing ok, however patient told Nurse manager this morning that he also had ingested a alejandro, later on he told they were like 3-4 dimes but was only able to swallow one or two, AXR showed circular metallic object/objects overlying the ascending colon in the right lower quadrant, possibly 2 pennies. No bowel obstruction. Per GI will give 2L golytely.  Check daily EKG, started on abilify per Psych, QTc on --> however upon our review QTc is 470  Leukocytosis improving  Correcting hypernatremia  Likely transfer to [facility] tomorrow, apprec Psych recs  Starting amlodipine for HTN  PT --> [facility]  Rest as above

## 2020-03-26 NOTE — PROGRESS NOTE ADULT - PROBLEM/PLAN-9
"Problem: Patient Care Overview  Goal: Plan of Care/Patient Progress Review  PT: Eval complete, treatment initiated. Reviewed spinal precautions. Edu on PT role and POC. Pt states he is optimistic that he may have return of either sensation/strength, provided therapeutic listening and emphasis on goals of becoming as IND w/ mobility as possible. Pt pleasant and cooperative. Focus on bed mobility, rolling R and L using logroll technique x 3 w/ Max A x 1 to manage BLE. Supine > Sitting w/ HOB elevated and Max A x 1. Pt tolerated sitting ~5 min prior to stating he felt \"clamy\" this also happened during hospital stay. /70; VSS. At this time pt requires use of BUE support and close CGA when sitting EOB. PM plan transfer to appropriate WC, work on sitting balance, bed mobility    ELOS 4 weeks. Pt's home will need to be modified. Mostly carpeted. Small BR, WC will not fit, tub shower. Pt will need ramp installed. Son planning to move home this week, 27 y/o, works full time. Pt has 2nd son living in [place] 27 y/o. Supportive GF who also has 3 grown children     Goal for mod I WC based mobility         " DISPLAY PLAN FREE TEXT

## 2020-03-26 NOTE — PHYSICAL THERAPY INITIAL EVALUATION ADULT - GAIT DEVIATIONS NOTED, PT EVAL
slightly unsteady gait, no lose of balance, decreased heel strike and hip flexion bilaterally./decreased step length/decreased mau/increased time in double stance

## 2020-03-26 NOTE — PROGRESS NOTE ADULT - PROBLEM SELECTOR PLAN 6
Likely 2/2 to caustic ingestion causing upper GI irritation. Management as above per GI    #hyperglycemia and obesity  - A1c  - insulin coverage as necessary pending a1c DVT ppx: will hold pending UGIB  GI ppx: c/w protonix    F: IVF as needed  E: K > 4. Mg >2  N: NPO for now

## 2020-03-26 NOTE — PROGRESS NOTE ADULT - PROBLEM SELECTOR PLAN 5
increased anion gap metabolic acidosis POA. lactate WNL, calcium oxylate noted in urine. Per poison control bleech can cause metabolic acidosis and also bowel necrosis. Low suspicion for bowel necrosis currently as GI exam benign and lactate WNL.   - monitor bmp-recheck in evening  - serum osm, and serum ethylene glycol level to be sent due to evidence of calcium oxylate crystals in urine. extensive past psych history, unsure of home meds, hx of suicide attempt.  -psych following appreciate recs-->will resume PO lexapro 20 mg, abilify 10 mg  -monitor qtc, daily ekg  - 1 to 1

## 2020-03-26 NOTE — PHYSICAL THERAPY INITIAL EVALUATION ADULT - CRITERIA FOR SKILLED THERAPEUTIC INTERVENTIONS
rehab potential/therapy frequency/anticipated discharge recommendation/impairments found/functional limitations in following categories

## 2020-03-26 NOTE — PROGRESS NOTE ADULT - PROBLEM SELECTOR PLAN 2
Discussed with poison control center, patient at risk for laryngeal edema and GI perforation. Currently patient speaking in full sentences, no stridor appreciated over upper airway on exam, no crepitus over chest wall, no evidence of free air on imaging. CT consistent with esophagitis.  - GI following-->advance diet slowly (transferred from clears to full liquid)  -will resume PO meds    #ingestion of coins  -patient noted he also swallowed coins prior too bleach ingestion  -abdominal xray showing coins near ascending colon  -given 2 L golytely  -monitor BMS

## 2020-03-27 ENCOUNTER — TRANSCRIPTION ENCOUNTER (OUTPATIENT)
Age: 70
End: 2020-03-27

## 2020-03-27 ENCOUNTER — INPATIENT (INPATIENT)
Facility: HOSPITAL | Age: 70
LOS: 5 days | Discharge: PSYCHIATRIC OR SUBSTANCE USE FACILITY | DRG: 885 | End: 2020-04-02
Attending: PSYCHIATRY & NEUROLOGY | Admitting: PSYCHIATRY & NEUROLOGY
Payer: MEDICARE

## 2020-03-27 VITALS
HEART RATE: 83 BPM | RESPIRATION RATE: 17 BRPM | TEMPERATURE: 98 F | OXYGEN SATURATION: 98 % | DIASTOLIC BLOOD PRESSURE: 80 MMHG | SYSTOLIC BLOOD PRESSURE: 143 MMHG

## 2020-03-27 VITALS
DIASTOLIC BLOOD PRESSURE: 78 MMHG | RESPIRATION RATE: 20 BRPM | SYSTOLIC BLOOD PRESSURE: 121 MMHG | WEIGHT: 188.5 LBS | HEIGHT: 68 IN | OXYGEN SATURATION: 97 % | HEART RATE: 96 BPM | TEMPERATURE: 98 F

## 2020-03-27 DIAGNOSIS — Z96.651 PRESENCE OF RIGHT ARTIFICIAL KNEE JOINT: Chronic | ICD-10-CM

## 2020-03-27 DIAGNOSIS — F39 UNSPECIFIED MOOD [AFFECTIVE] DISORDER: ICD-10-CM

## 2020-03-27 DIAGNOSIS — Z98.890 OTHER SPECIFIED POSTPROCEDURAL STATES: Chronic | ICD-10-CM

## 2020-03-27 LAB
ANION GAP SERPL CALC-SCNC: 9 MMOL/L — SIGNIFICANT CHANGE UP (ref 5–17)
BUN SERPL-MCNC: 21 MG/DL — SIGNIFICANT CHANGE UP (ref 7–23)
CALCIUM SERPL-MCNC: 8.6 MG/DL — SIGNIFICANT CHANGE UP (ref 8.4–10.5)
CHLORIDE SERPL-SCNC: 109 MMOL/L — HIGH (ref 96–108)
CO2 SERPL-SCNC: 27 MMOL/L — SIGNIFICANT CHANGE UP (ref 22–31)
CREAT SERPL-MCNC: 0.92 MG/DL — SIGNIFICANT CHANGE UP (ref 0.5–1.3)
GLUCOSE SERPL-MCNC: 126 MG/DL — HIGH (ref 70–99)
MAGNESIUM SERPL-MCNC: 2 MG/DL — SIGNIFICANT CHANGE UP (ref 1.6–2.6)
POTASSIUM SERPL-MCNC: 4 MMOL/L — SIGNIFICANT CHANGE UP (ref 3.5–5.3)
POTASSIUM SERPL-SCNC: 4 MMOL/L — SIGNIFICANT CHANGE UP (ref 3.5–5.3)
SODIUM SERPL-SCNC: 145 MMOL/L — SIGNIFICANT CHANGE UP (ref 135–145)

## 2020-03-27 PROCEDURE — 85027 COMPLETE CBC AUTOMATED: CPT

## 2020-03-27 PROCEDURE — 97162 PT EVAL MOD COMPLEX 30 MIN: CPT

## 2020-03-27 PROCEDURE — 99233 SBSQ HOSP IP/OBS HIGH 50: CPT

## 2020-03-27 PROCEDURE — 74019 RADEX ABDOMEN 2 VIEWS: CPT | Mod: 26

## 2020-03-27 PROCEDURE — 83605 ASSAY OF LACTIC ACID: CPT

## 2020-03-27 PROCEDURE — 99239 HOSP IP/OBS DSCHRG MGMT >30: CPT | Mod: GC

## 2020-03-27 PROCEDURE — 74018 RADEX ABDOMEN 1 VIEW: CPT

## 2020-03-27 PROCEDURE — 80307 DRUG TEST PRSMV CHEM ANLYZR: CPT

## 2020-03-27 PROCEDURE — 85730 THROMBOPLASTIN TIME PARTIAL: CPT

## 2020-03-27 PROCEDURE — 80048 BASIC METABOLIC PNL TOTAL CA: CPT

## 2020-03-27 PROCEDURE — 83930 ASSAY OF BLOOD OSMOLALITY: CPT

## 2020-03-27 PROCEDURE — 84484 ASSAY OF TROPONIN QUANT: CPT

## 2020-03-27 PROCEDURE — 99285 EMERGENCY DEPT VISIT HI MDM: CPT | Mod: 25

## 2020-03-27 PROCEDURE — 87521 HEPATITIS C PROBE&RVRS TRNSC: CPT

## 2020-03-27 PROCEDURE — 96375 TX/PRO/DX INJ NEW DRUG ADDON: CPT

## 2020-03-27 PROCEDURE — 82693 ASSAY OF ETHYLENE GLYCOL: CPT

## 2020-03-27 PROCEDURE — 83735 ASSAY OF MAGNESIUM: CPT

## 2020-03-27 PROCEDURE — 74019 RADEX ABDOMEN 2 VIEWS: CPT

## 2020-03-27 PROCEDURE — 86803 HEPATITIS C AB TEST: CPT

## 2020-03-27 PROCEDURE — 81001 URINALYSIS AUTO W/SCOPE: CPT

## 2020-03-27 PROCEDURE — 96376 TX/PRO/DX INJ SAME DRUG ADON: CPT

## 2020-03-27 PROCEDURE — 93005 ELECTROCARDIOGRAM TRACING: CPT

## 2020-03-27 PROCEDURE — 85610 PROTHROMBIN TIME: CPT

## 2020-03-27 PROCEDURE — 85025 COMPLETE CBC W/AUTO DIFF WBC: CPT

## 2020-03-27 PROCEDURE — 71045 X-RAY EXAM CHEST 1 VIEW: CPT

## 2020-03-27 PROCEDURE — 36415 COLL VENOUS BLD VENIPUNCTURE: CPT

## 2020-03-27 PROCEDURE — 71250 CT THORAX DX C-: CPT

## 2020-03-27 PROCEDURE — 96374 THER/PROPH/DIAG INJ IV PUSH: CPT

## 2020-03-27 PROCEDURE — 82550 ASSAY OF CK (CPK): CPT

## 2020-03-27 PROCEDURE — 80053 COMPREHEN METABOLIC PANEL: CPT

## 2020-03-27 PROCEDURE — 97116 GAIT TRAINING THERAPY: CPT

## 2020-03-27 RX ORDER — PANTOPRAZOLE SODIUM 20 MG/1
1 TABLET, DELAYED RELEASE ORAL
Qty: 0 | Refills: 0 | DISCHARGE
Start: 2020-03-27

## 2020-03-27 RX ORDER — SODIUM CHLORIDE 9 MG/ML
1000 INJECTION, SOLUTION INTRAVENOUS
Refills: 0 | Status: DISCONTINUED | OUTPATIENT
Start: 2020-03-27 | End: 2020-03-27

## 2020-03-27 RX ORDER — ENOXAPARIN SODIUM 100 MG/ML
40 INJECTION SUBCUTANEOUS EVERY 24 HOURS
Refills: 0 | Status: DISCONTINUED | OUTPATIENT
Start: 2020-03-27 | End: 2020-03-27

## 2020-03-27 RX ORDER — SOD SULF/SODIUM/NAHCO3/KCL/PEG
2000 SOLUTION, RECONSTITUTED, ORAL ORAL ONCE
Refills: 0 | Status: COMPLETED | OUTPATIENT
Start: 2020-03-27 | End: 2020-03-27

## 2020-03-27 RX ORDER — ARIPIPRAZOLE 15 MG/1
10 TABLET ORAL DAILY
Refills: 0 | Status: DISCONTINUED | OUTPATIENT
Start: 2020-03-27 | End: 2020-04-01

## 2020-03-27 RX ORDER — LANOLIN ALCOHOL/MO/W.PET/CERES
5 CREAM (GRAM) TOPICAL AT BEDTIME
Refills: 0 | Status: DISCONTINUED | OUTPATIENT
Start: 2020-03-27 | End: 2020-04-02

## 2020-03-27 RX ORDER — ESCITALOPRAM OXALATE 10 MG/1
20 TABLET, FILM COATED ORAL DAILY
Refills: 0 | Status: DISCONTINUED | OUTPATIENT
Start: 2020-03-27 | End: 2020-04-02

## 2020-03-27 RX ORDER — PANTOPRAZOLE SODIUM 20 MG/1
40 TABLET, DELAYED RELEASE ORAL ONCE
Refills: 0 | Status: COMPLETED | OUTPATIENT
Start: 2020-03-27 | End: 2020-03-27

## 2020-03-27 RX ORDER — PANTOPRAZOLE SODIUM 20 MG/1
40 TABLET, DELAYED RELEASE ORAL
Refills: 0 | Status: DISCONTINUED | OUTPATIENT
Start: 2020-03-27 | End: 2020-03-28

## 2020-03-27 RX ORDER — PANTOPRAZOLE SODIUM 20 MG/1
40 TABLET, DELAYED RELEASE ORAL
Refills: 0 | Status: DISCONTINUED | OUTPATIENT
Start: 2020-03-28 | End: 2020-03-27

## 2020-03-27 RX ORDER — AMLODIPINE BESYLATE 2.5 MG/1
5 TABLET ORAL DAILY
Refills: 0 | Status: DISCONTINUED | OUTPATIENT
Start: 2020-03-27 | End: 2020-04-02

## 2020-03-27 RX ORDER — ARIPIPRAZOLE 15 MG/1
1 TABLET ORAL
Qty: 0 | Refills: 0 | DISCHARGE
Start: 2020-03-27

## 2020-03-27 RX ORDER — ACETAMINOPHEN 500 MG
650 TABLET ORAL EVERY 6 HOURS
Refills: 0 | Status: DISCONTINUED | OUTPATIENT
Start: 2020-03-27 | End: 2020-04-02

## 2020-03-27 RX ORDER — AMLODIPINE BESYLATE 2.5 MG/1
1 TABLET ORAL
Qty: 0 | Refills: 0 | DISCHARGE
Start: 2020-03-27

## 2020-03-27 RX ADMIN — AMLODIPINE BESYLATE 5 MILLIGRAM(S): 2.5 TABLET ORAL at 07:13

## 2020-03-27 RX ADMIN — Medication 5 MILLIGRAM(S): at 23:27

## 2020-03-27 RX ADMIN — Medication 650 MILLIGRAM(S): at 23:26

## 2020-03-27 RX ADMIN — PANTOPRAZOLE SODIUM 40 MILLIGRAM(S): 20 TABLET, DELAYED RELEASE ORAL at 09:03

## 2020-03-27 RX ADMIN — ESCITALOPRAM OXALATE 20 MILLIGRAM(S): 10 TABLET, FILM COATED ORAL at 11:21

## 2020-03-27 RX ADMIN — Medication 2000 MILLILITER(S): at 10:47

## 2020-03-27 RX ADMIN — ENOXAPARIN SODIUM 40 MILLIGRAM(S): 100 INJECTION SUBCUTANEOUS at 11:21

## 2020-03-27 RX ADMIN — ARIPIPRAZOLE 10 MILLIGRAM(S): 15 TABLET ORAL at 11:21

## 2020-03-27 RX ADMIN — SODIUM CHLORIDE 100 MILLILITER(S): 9 INJECTION, SOLUTION INTRAVENOUS at 07:13

## 2020-03-27 RX ADMIN — SODIUM CHLORIDE 100 MILLILITER(S): 9 INJECTION, SOLUTION INTRAVENOUS at 01:38

## 2020-03-27 NOTE — DISCHARGE NOTE NURSING/CASE MANAGEMENT/SOCIAL WORK - PATIENT PORTAL LINK FT
You can access the FollowMyHealth Patient Portal offered by [facility] by registering at the following website: [email or web address]. By joining [facility]’s FollowMyHealth portal, you will also be able to view your health information using other applications (apps) compatible with our system.

## 2020-03-27 NOTE — PROGRESS NOTE BEHAVIORAL HEALTH - NSBHADMITCOUNSEL_PSY_A_CORE
diagnostic results/impressions and/or recommended studies/importance of adherence to chosen treatment
importance of adherence to chosen treatment/diagnostic results/impressions and/or recommended studies/client/family/caregiver education/instructions for management, treatment and follow up/risk factor reduction/prognosis/risks and benefits of treatment options/other...
risks and benefits of treatment options/client/family/caregiver education/other.../diagnostic results/impressions and/or recommended studies/importance of adherence to chosen treatment/instructions for management, treatment and follow up/risk factor reduction

## 2020-03-27 NOTE — PROGRESS NOTE ADULT - PROBLEM SELECTOR PLAN 5
extensive past psych history, unsure of home meds, hx of suicide attempt.  -psych following appreciate recs--> PO lexapro 20 mg, abilify 10 mg (AM Qtc 456)  - 1 to 1

## 2020-03-27 NOTE — PROGRESS NOTE BEHAVIORAL HEALTH - SUMMARY
70M with  with unclear PPH, likely bipolar disorder, one prior psychiatric admission s/p SA, presenting to [facility] after hematemesis in the setting of caustic ingestion as a suicide attempt. Patient currently presents with low mood, high anxiety and distress secondary to paranoid delusions (related to thinking that he caused the Covid 19 epidemic).     -start haldol 2mg IV qhs for treatment of psychosis; to be switched in the future, when patient able to tolerate PO, to a second generation antipsychotic; monitor for Qtc prolongation  -when able to tolerate po, restart lexapro 20mg po daily for treatment of depression (unclear if he was on 20mg daily or30mg -higher than FDA approved- to be clarified)  -hold trazodone 50mg po qhs for now  -1:1 observation   -Will need psychiatric admission. Have let Dr. Sheikh, unit chief of [facility] know about patient.  -Obtain PT consult   -CL to continue to follow
70M with  with unclear PPH, likely bipolar disorder, one prior psychiatric admission s/p SA, presenting to [facility] after hematemesis in the setting of caustic ingestion as a suicide attempt. Patient currently presents with low mood, high anxiety and distress secondary to paranoid delusions (related to thinking that he caused the Covid 19 epidemic).     -haldol held by the primary team due to qtc prolongation (qtc 497ms). Patient to have a repeat EKG today and if stable, to be started on abilify 10mg po daily  -lexapro 20mg po daily for treatment of depression was restarted  -continue to hold trazodone 50mg po qhs for now  -1:1 observation   -Inpatient psychiatric unit chief aware of the transfer for inpatient treatment; primary medical team to repeat the BMP this afternoon (patient still hypernatremic)  -CL to continue to follow
70M with  with unclear PPH, likely bipolar disorder, one prior psychiatric admission s/p SA, presenting to [facility] after hematemesis in the setting of caustic ingestion as a suicide attempt. Patient currently presents with low mood, high anxiety and distress secondary to paranoid delusions (related to thinking that he caused the Covid 19 epidemic).     -haldol held by the primary team due to qtc prolongation (qtc 497ms). abilify 10mg po daily  -lexapro 20mg po daily for treatment of depression was restarted  -continue to hold trazodone 50mg po qhs for now  -1:1 observation   -Inpatient psychiatric unit chief aware of the transfer for inpatient treatment, 8U nurse manager wanted to see patient out of concern for pt's deconditioning. If medically cleared, pt would benefit from being involuntarily transferred to 8U. My physician piterficate is written. Dr. Gold could write another and 4U nurse manager could complete Part A of [facility] application.  -CL to continue to follow

## 2020-03-27 NOTE — PROGRESS NOTE BEHAVIORAL HEALTH - NSBHCONSULTRECOMMENDOTHER_PSY_A_CORE FT
-haldol held by the primary team due to qtc prolongation (qtc 497ms). abilify 10mg po daily  -lexapro 20mg po daily for treatment of depression was restarted  -continue to hold trazodone 50mg po qhs for now  -1:1 observation. Would continue 1:1 observation on [facility] for now due to degree of suicidality and psychosis,  -Inpatient psychiatric unit chief aware of the transfer for inpatient treatment,  nurse manager wanted to see patient out of concern for pt's deconditioning. If medically cleared, pt would benefit from being involuntarily transferred to U. My physician certficate is written. Dr. Gold could write another and 4U nurse manager could complete Part A of [facility] application.  -CL to continue to follow

## 2020-03-27 NOTE — PROGRESS NOTE ADULT - PROBLEM SELECTOR PLAN 2
Discussed with poison control center, patient at risk for laryngeal edema and GI perforation. Currently patient speaking in full sentences, no stridor appreciated over upper airway on exam, no crepitus over chest wall, no evidence of free air on imaging. CT consistent with esophagitis.  - GI following-->advance diet slowly (on soft diet)  -will resume PO meds

## 2020-03-27 NOTE — DIETITIAN INITIAL EVALUATION ADULT. - OTHER INFO
70M with hx of bipolar disorder, Depression, schizophrenia-with past suicide attempts now presenting after hematemesis in the setting of caustic ingestion of bleach and coins. Bleed likely UGI 2/2 to caustic ingestion with bleach- now resolved. Patient tolerated 2 L golytely overnight and has had multiple bowel movements for ingested coins. CT consistent with esophagitis. Plan for transfer to [facility] when medically stable. On assessment, pt is resting in bed wishing to be left alone. Per discussion with PCA, pt c/p stomach pain with PO intake- appears to have improved today per EMR. Cont to assess PO tolerance and encourage PO intake. Currently pt is on soft diet- encourage cold food items to aid with esophagitis pain. Unable to assess PO intake, UBW PTA, or confirm NKFA. GI: WDL, last BM 3/26, esophagitis (pain with PO intake). Pain: Declines pain per flowsheet. Skin: WDL, nallely score 19, erythema over back and buttocks 2/2 bleach. Please see nutrition recommendations below. RD to follow up per protocol.

## 2020-03-27 NOTE — PROGRESS NOTE ADULT - PROBLEM SELECTOR PLAN 4
likely multifactorial as patient with ingestion of sodium hypochlorite this am, per poison control can cause transient hypernatremia.  -d5w maintenance  - Trend BMP  - monitor mental status.

## 2020-03-27 NOTE — PROGRESS NOTE ADULT - REASON FOR ADMISSION
caustic ingestion and hematemesis

## 2020-03-27 NOTE — PROGRESS NOTE ADULT - PROBLEM SELECTOR PLAN 1
Resolved  -  c/w protonix 40 mg BID  - GI following  - maintain 2 large bore IV access, monitor HD  - Type and screen    #ingestion of coins  -patient noted he also swallowed coins prior too bleach ingestion  -abdominal xray showing coins near ascending colon  -given 2 L golytely (tolerated)  -f/u am abdominal xray  -monitor BMS.

## 2020-03-27 NOTE — PROGRESS NOTE BEHAVIORAL HEALTH - NSBHFUPINTERVALCCFT_PSY_A_CORE
70M with  with unclear PPH, likely bipolar disorder, one prior psychiatric admission s/p SA, presenting to [facility] after hematemesis in the setting of caustic ingestion as a suicide attempt. Patient currently presents with low mood, high anxiety and distress secondary to paranoid delusions (related to thinking that he caused the Covid 19 epidemic).
70M with  with unclear PPH, likely bipolar disorder, one prior psychiatric admission s/p SA, presenting to [facility] after hematemesis in the setting of caustic ingestion as a suicide attempt. Patient currently presents with low mood, high anxiety and distress secondary to paranoid delusions (related to thinking that he caused the Covid 19 epidemic).
70M with  with unclear PPH, likely bipolar disorder, one prior psychiatric admission s/p SA, presenting to [facility] after hematemesis in the setting of caustic ingestion as a suicide attempt. Patient currently presents with low mood, high anxiety and distress secondary to paranoid delusions (related to thinking that he caused the Covid 19 epidemic).
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
## 2020-03-27 NOTE — PROGRESS NOTE ADULT - SUBJECTIVE AND OBJECTIVE BOX
Physical Medicine and Rehabilitation Progress Note:    Patient is a 70y old  Male who presents with a chief complaint of caustic ingestion and hematemesis (27 Mar 2020 07:44)      HPI:  70M with psych pmhx (?bipolar disorder, ?schizophrenia-with past suicide attempts) presents after hematemesis in the setting of caustic ingestion. Patient has been feeling sad, depressed and guilty for last 4 months in addition to experiencing multiple "hot flashes" and runny nose and has now been concerned that he is responsible for the coronavirus pandemic. Because of the guilt associated with his self blame for covid pandemic (and acute episode of psychosis when he believed his partner had passed away) patient attempted to intentionally overdose this am by drinking half a bottle of bleach while laying down in bathtub. After episode patient experienced multiple episodes of emesis with bloody and black flecks in the vomitus-unable to quantify. Did not attempt PO afterwards. Additionally patient notes bottle of bleach knocked over while laying in bath tub with bleach coming into contact with his back. After episode patient noted coughing but denies light-headedness, CP, sob, stridor, or stomach pain.     ED course:   Vitals: T afebrile, HR , //88, RR 20, SPo2 % on room air  Labs: no leukocytosis (no bands, no lymphopenia), Hgb 15.8, , Na 150, Cl 110, Bicarb 21-gap 19, BUN 27, glucose 200, LFTs WNL, utox negative, acetaminophen level <5,  BAL< 10, salicylate <0.3. Lactate 1.9. Utox negative. UA spec grav 1.030, ketones present, calcium oxylate crystals present, no hematuria. Proteinuria.   Imaging: CT Chest No Cont  1. Diffuse esophageal wall thickening likely esophagitis in setting of causticingestion. No esophageal perforation. No pneumomediastinum.  2. Ground glass opacities seen in the right upper and lower lobe. Differential includes aspiration, edema, hemorrhage, infection.   Meds: 1L NS, 4mg IV zofran, 40mg IV protnix and was started on a protonix drip  Consults: ICU, Psych, GI (24 Mar 2020 08:58)                            14.0   12.34 )-----------( 164      ( 26 Mar 2020 07:35 )             43.1       03-27    145  |  109<H>  |  21  ----------------------------<  126<H>  4.0   |  27  |  0.92    Ca    8.6      27 Mar 2020 08:33  Mg     2.0     03-27      Vital Signs Last 24 Hrs  T(C): 36.6 (27 Mar 2020 09:21), Max: 37.2 (26 Mar 2020 17:04)  T(F): 97.9 (27 Mar 2020 09:21), Max: 99 (26 Mar 2020 17:04)  HR: 84 (27 Mar 2020 09:21) (78 - 95)  BP: 136/78 (27 Mar 2020 09:21) (136/78 - 157/69)  BP(mean): 111 (27 Mar 2020 04:19) (111 - 111)  RR: 18 (27 Mar 2020 09:21) (17 - 18)  SpO2: 85% (27 Mar 2020 09:21) (85% - 99%)    MEDICATIONS  (STANDING):  amLODIPine   Tablet 5 milliGRAM(s) Oral daily  ARIPiprazole 10 milliGRAM(s) Oral daily  enoxaparin Injectable 40 milliGRAM(s) SubCutaneous every 24 hours  escitalopram 20 milliGRAM(s) Oral daily    MEDICATIONS  (PRN):  acetaminophen   Tablet .. 650 milliGRAM(s) Oral every 6 hours PRN Mild Pain (1-3)    Currently Undergoing Physical Therapy at bedside.    Functional Status Assessment:      Cognitive/Perceptual/Neuro  Cognitive/Neuro/Behavioral [WDL Definition: Alert; opens eyes spontaneously; arouses to voice or touch; oriented x 4; follows commands; speech spontaneous, logical; purposeful motor response; behavior appropriate to situation]: WDL    Therapeutic Interventions      Bed Mobility  Bed Mobility Training Rolling/Turning: contact guard;  verbal cues;  1 person assist;  bed rails  Bed Mobility Training Supine-to-Sit: minimum assist (75% patient effort);  1 person assist;  verbal cues;  bed rails;  moderate assist (50% patient effort)  Bed Mobility Training Limitations: decreased ability to use arms for pushing/pulling;  decreased ability to use legs for bridging/pushing    Sit-Stand Transfer Training  Transfer Training Sit-to-Stand Transfer: contact guard;  1 person assist;  verbal cues;  weight-bearing as tolerated   rolling walker  Transfer Training Stand-to-Sit Transfer: contact guard;  verbal cues;  1 person assist;  weight-bearing as tolerated   rolling walker  Sit-to-Stand Transfer Training Transfer Safety Analysis: decreased balance;  impaired balance;  rolling walker    Gait Training  Gait Training Symptoms Noted During/After Treatment: fatigue  Gait Training: contact guard;  verbal cues;  1 person assist;  VCs for sequencing, to step into RW;  weight-bearing as tolerated   rolling walker;  40 feet  Gait Analysis: 3-point gait   decreased mau;  decreased hip/knee flexion;  decreased step length;  decreased stride length;  shuffling;  keeps RW too far away from body;  impaired balance;  40 feet;  rolling walker  Gait Number of Times:: x 2  Type of Rest Type of Rest: standing  Duration of Rest Duration of Rest: 45 seconds     Therapeutic Exercise  Therapeutic Exercise Detail: In sitting in chair bilateral hip flex, knee ext, hip abd/add x 10 reps each exercise           PM&R Impression: as above    Current Disposition Plan Recommendations: subacute rehab placement

## 2020-03-27 NOTE — DIETITIAN INITIAL EVALUATION ADULT. - PROBLEM SELECTOR PLAN 1
Patient with episodes of emesis this am in the setting of caustic ingestion with bleach. Emesis noted to be black and bloody patient unable to quantify. Did not attempt to tolerate PO. Case discussed with [facility] poison control center who advised monitoring of HD status and GI consult. Patient currently tachycardic but manual HR count in ED 64 bpm. Mentating well, making urine, BPs normotensive hgb 15. Patient s/p 2L in ED given zofran and protonix. Bleed likely UGI 2/2 to caustic ingestion with bleach  - NPO for now, will c/w protonix gtt for now pending further GI recs  - GI following appreciate recs, for scope later today to evaluate extent of damage will keep NPO for now  - maintain 2 large bore IV access, monitor HD  - Type and screen

## 2020-03-27 NOTE — PROGRESS NOTE BEHAVIORAL HEALTH - NSBHFUPINTERVALHXFT_PSY_A_CORE
Continues to seem depressed and guilty. When asked whether he was relieved he had survived, he said "Yes and no," and then looked away from me. He agrees that he likely needs psychiatric inpatient hospitalization again but seems very depressed at the prospect of it.
Assessment limited due to pt's having to use the bathroom repeatedly due to Golytlely. Team reports he has been walking to the bathroom on his own. Hypernatremia has resolved. Abdominal X ray shows no movement in coin position but has no been eating much.
Patient seen at bedside. He continues to endorse low mood and high anxiety due to thoughts that he caused the covid 19 pandemic and that his partner may be at risk of dying. He reports low appetite (as per the primary team, patient had low appetite prior to admission).He agrees with the current plan to start inpatient psychiatric treatment.

## 2020-03-27 NOTE — DIETITIAN INITIAL EVALUATION ADULT. - ENERGY NEEDS
Height: 68" Weight: 230lbs, IBW 154lbs+/-10%, %%, BMI 35.0 kg/m2  IBW used for calculations as pt >120% of IBW. Needs adjusted for advanced age.

## 2020-03-27 NOTE — PROGRESS NOTE ADULT - ATTENDING COMMENTS
Overall feeling better, tolerating PO, nausea has improved  Still feels depressed, apprec Psych recs and d/w them, 2 PC done  Hasn't passed the coins yet, still working on taking golytely- AXR reviewed by me-coins remain on same site as yesterday  Hypernatremia corrected  EKG done today with improved QTc-at 456-  Pt is stable for d/c to Psych today.  Rest as above Overall feeling better, tolerating PO, nausea has improved  Still feels depressed, apprec Psych recs and d/w them, 2 PC done  Hasn't passed the coins yet, still working on taking golytely- AXR reviewed by me-coins remain on same site as yesterday  Hypernatremia corrected  EKG done today with improved QTc-at 456-  Pt is stable for d/c to Psych today.  PT should follow up patient in 8UR.  Wound care is pending to see him for burn on back.  Rest as above

## 2020-03-27 NOTE — PROGRESS NOTE ADULT - SUBJECTIVE AND OBJECTIVE BOX
Patient with no acute events overnight. patient still endorses a little bit of nausea, but states other GI symtpoms including pain have resolved. Patient tolerated 2 L golytely overnight and has had multiple bowel movements.      T(C): 36.6 (03-27-20 @ 04:19), Max: 37.2 (03-26-20 @ 17:04)  HR: 85 (03-27-20 @ 04:19) (78 - 95)  BP: 155/89 (03-27-20 @ 04:19) (143/68 - 163/89)  RR: 17 (03-27-20 @ 04:19) (17 - 18)  SpO2: 97% (03-27-20 @ 04:19) (97% - 99%)  Wt(kg): --Vital Signs Last 24 Hrs    Review of Systems:  -All other ROS negative, except those noted in HPI    PHYSICAL EXAM:  GENERAL: NAD, laying in bed  HEAD:  Atraumatic, Normocephalic  EYES: EOMI, PERRLA, conjunctiva and sclera clear  ENMT: No tonsillar erythema, exudates, or enlargement; Moist mucous membranes, Good dentition, No lesions  NECK: Supple, No JVD  NERVOUS SYSTEM:  Alert & Oriented X3,  CHEST/LUNG: Clear to percussion bilaterally; No rales, rhonchi, wheezing, or rubs  HEART: Regular rate and rhythm; No murmurs, rubs, or gallops  ABDOMEN: Soft, Nontender, Nondistended; Bowel sounds present  EXTREMITIES:  2+ Peripheral Pulses, No clubbing, cyanosis, or edema  LYMPH: No lymphadenopathy noted  SKIN: erythematous caustic injury present on back    acetaminophen   Tablet .. 650 milliGRAM(s) Oral every 6 hours PRN  amLODIPine   Tablet 5 milliGRAM(s) Oral daily  ARIPiprazole 10 milliGRAM(s) Oral daily  dextrose 5%. 1000 milliLiter(s) IV Continuous <Continuous>  enoxaparin Injectable 40 milliGRAM(s) SubCutaneous every 24 hours  escitalopram 20 milliGRAM(s) Oral daily  ondansetron Injectable 4 milliGRAM(s) IV Push every 4 hours PRN  pantoprazole  Injectable 40 milliGRAM(s) IV Push once      LABS:                        14.0   12.34 )-----------( 164      ( 26 Mar 2020 07:35 )             43.1     03-26    149<H>  |  111<H>  |  24<H>  ----------------------------<  116<H>  4.7   |  27  |  0.96    Ca    8.7      26 Mar 2020 23:09  Mg     1.8     03-26          CAPILLARY BLOOD GLUCOSE

## 2020-03-28 DIAGNOSIS — F29 UNSPECIFIED PSYCHOSIS NOT DUE TO A SUBSTANCE OR KNOWN PHYSIOLOGICAL CONDITION: ICD-10-CM

## 2020-03-28 LAB
ANION GAP SERPL CALC-SCNC: 11 MMOL/L — SIGNIFICANT CHANGE UP (ref 5–17)
BUN SERPL-MCNC: 23 MG/DL — SIGNIFICANT CHANGE UP (ref 7–23)
CALCIUM SERPL-MCNC: 9.3 MG/DL — SIGNIFICANT CHANGE UP (ref 8.4–10.5)
CHLORIDE SERPL-SCNC: 103 MMOL/L — SIGNIFICANT CHANGE UP (ref 96–108)
CO2 SERPL-SCNC: 30 MMOL/L — SIGNIFICANT CHANGE UP (ref 22–31)
CREAT SERPL-MCNC: 0.99 MG/DL — SIGNIFICANT CHANGE UP (ref 0.5–1.3)
GLUCOSE SERPL-MCNC: 121 MG/DL — HIGH (ref 70–99)
POTASSIUM SERPL-MCNC: 3.6 MMOL/L — SIGNIFICANT CHANGE UP (ref 3.5–5.3)
POTASSIUM SERPL-SCNC: 3.6 MMOL/L — SIGNIFICANT CHANGE UP (ref 3.5–5.3)
SODIUM SERPL-SCNC: 144 MMOL/L — SIGNIFICANT CHANGE UP (ref 135–145)

## 2020-03-28 RX ORDER — PANTOPRAZOLE SODIUM 20 MG/1
40 TABLET, DELAYED RELEASE ORAL
Refills: 0 | Status: DISCONTINUED | OUTPATIENT
Start: 2020-03-28 | End: 2020-04-02

## 2020-03-28 RX ADMIN — Medication 650 MILLIGRAM(S): at 00:15

## 2020-03-28 RX ADMIN — Medication 5 MILLIGRAM(S): at 21:49

## 2020-03-28 RX ADMIN — ESCITALOPRAM OXALATE 20 MILLIGRAM(S): 10 TABLET, FILM COATED ORAL at 10:58

## 2020-03-28 RX ADMIN — ARIPIPRAZOLE 10 MILLIGRAM(S): 15 TABLET ORAL at 10:58

## 2020-03-28 RX ADMIN — Medication 650 MILLIGRAM(S): at 22:19

## 2020-03-28 RX ADMIN — PANTOPRAZOLE SODIUM 40 MILLIGRAM(S): 20 TABLET, DELAYED RELEASE ORAL at 21:37

## 2020-03-28 RX ADMIN — PANTOPRAZOLE SODIUM 40 MILLIGRAM(S): 20 TABLET, DELAYED RELEASE ORAL at 07:36

## 2020-03-28 RX ADMIN — Medication 650 MILLIGRAM(S): at 21:50

## 2020-03-28 RX ADMIN — AMLODIPINE BESYLATE 5 MILLIGRAM(S): 2.5 TABLET ORAL at 10:58

## 2020-03-28 NOTE — BEHAVIORAL HEALTH ASSESSMENT NOTE - PROBLEM SELECTOR PLAN 1
Plan:  - Continue Abilify 10 mg q24 po as there is minimal risk for qtc prolongation, monitor with repeat ekg   - Continue Lexapro 20 mg po daily for treatment of depression   - Continue melatonin prn for sleep Plan:  - Continue Abilify 10 mg q24 po as there is minimal risk for qtc prolongation, monitor with repeat ekg   - Continue Lexapro 20 mg po daily for treatment of depression   - Continue melatonin prn for sleep  - Continue Lexapro 20 mg po daily for treatment of depression   - Continue melatonin prn for sleep

## 2020-03-28 NOTE — BEHAVIORAL HEALTH ASSESSMENT NOTE - NSBHCHARTREVIEWLAB_PSY_A_CORE FT
03-28    144  |  103  |  23  ----------------------------<  121<H>  3.6   |  30  |  0.99    Ca    9.3      28 Mar 2020 12:43  Mg     2.0     03-27

## 2020-03-28 NOTE — CHART NOTE - NSCHARTNOTEFT_GEN_A_CORE
SUMIT ROSADO  70y  Male  CC: Patient is a 70 year old man who presented 3/24 with a chief complaint of caustic ingestion (bleach and hematemesis) admitted to psychiatry for SI and depression.     HPI: Patient is a 70 year old man with unclear psych history (presumed bipolar disorder per psych), HTN, who presented 3/24 to medicine for caustic ingestion (bleach) and now admitted for psychiatry for SI and depression. Patient discharged 3/27. Patient had ingested bleach due to concern that he had cause the COVID 19 epidemic. Admission consisted of GI consultation for hematemesis which resolved and now is on PPI BID. CT showed esophageal wall thickening w/o perf. Patient admission w/o airway compromise. Diet was advanced. No EGD required per GI given resolution of symptoms. Course complicated by hypernatremia, per poison control likely 2/2 to sodium hypochlorite ingestion. Resolved on discharge. Course further complicated by coin ingestion. Coins had not passed prior to discharge and were located in the ascending colon on abdominal film. No intervention required.     PAST MEDICAL/SURGICAL HISTORY  Bipolar disorder w/ one prior psych admission for suicide attempt   HTN  S/p total knee replacement right  h/o hernia repair     Home Medications:  amLODIPine 5 mg oral tablet: 1 tab(s) orally once a day (27 Mar 2020 16:58)  ARIPiprazole 10 mg oral tablet: 1 tab(s) orally once a day (27 Mar 2020 16:58)  escitalopram 20 mg oral tablet: 1 tab(s) orally once a day (26 Mar 2020 07:34)  pantoprazole 40 mg oral delayed release tablet: 1 tab(s) orally once a day (before a meal) (27 Mar 2020 16:58)  traZODone 50 mg oral tablet: 1 tab(s) orally 2 times a day (25 Mar 2020 10:59)  trimethobenzamide 100 mg/mL intramuscular solution: 2 milliliter(s) intramuscular every 6 hours, As needed, Nausea (26 Mar 2020 07:34)    Allergies  No Known Allergies  Intolerances    FAMILY HISTORY:  FH: schizophrenia    Social History:   lives with his partner Noah. No smoking, no drinking, no illicit drug use.     ROS: 12 point review of systems otherwise negative see HPI above.     VITAL SIGNS:  T(F): 98.4 (03-27-20 @ 19:25), Max: 98.4 (03-27-20 @ 19:25)  HR: 96 (03-27-20 @ 19:25) (83 - 96)  BP: 121/78 (03-27-20 @ 19:25) (121/78 - 143/80)  BP(mean): --  RR: 20 (03-27-20 @ 19:25) (17 - 20)  SpO2: 97% (03-27-20 @ 19:25) (97% - 98%)    PHYSICAL EXAM:  Constitutional: WDWN resting comfortably in bed; NAD  HEENT: NC/AT, PERRL, EOMI, anicteric sclera, no nasal discharge; uvula midline, no oropharyngeal erythema or exudates; MMM  Neck: supple; no JVD or thyromegaly  Respiratory: CTA B/L; no W/R/R, no retractions  Cardiac: +S1/S2; RRR; no M/R/G; PMI non-displaced  Gastrointestinal: soft, NT/ND; no rebound or guarding; +BSx4  Genitourinary: normal external genitalia  Back: spine midline, no bony tenderness or step-offs; no CVAT B/L  Extremities: WWP, no clubbing or cyanosis; no peripheral edema  Musculoskeletal: NROM x4; no joint swelling, tenderness or erythema  Vascular: 2+ radial, femoral, DP/PT pulses B/L  Dermatologic: skin warm, dry and intact; no rashes, wounds, or scars  Lymphatic: no submandibular or cervical LAD  Neurologic: AAOx3; CNII-XII grossly intact; no focal deficits  Psychiatric: affect and characteristics of appearance, verbalizations, behaviors are appropriate, denies SI/HI/AH/    LABS:     03-27    145  |  109<H>  |  21  ----------------------------<  126<H>  4.0   |  27  |  0.92    Ca    8.6      27 Mar 2020 08:33  Mg     2.0     03-27        Inpatient Meds:   MEDICATIONS  (STANDING):  amLODIPine   Tablet 5 milliGRAM(s) Oral daily  ARIPiprazole 10 milliGRAM(s) Oral daily  escitalopram 20 milliGRAM(s) Oral daily  pantoprazole    Tablet 40 milliGRAM(s) Oral before breakfast  MEDICATIONS  (PRN):  acetaminophen   Tablet .. 650 milliGRAM(s) Oral every 6 hours PRN Moderate Pain (4 - 6)  LORazepam     Tablet 1 milliGRAM(s) Oral every 6 hours PRN Agitation  melatonin 5 milliGRAM(s) Oral at bedtime PRN Insomnia SUMIT ROSADO  70y  Male  CC: Patient is a 70 year old man who presented 3/24 with a chief complaint of caustic ingestion (bleach and hematemesis) admitted to psychiatry for SI and depression.     HPI: Patient is a 70 year old man with unclear psych history (presumed bipolar disorder per psych), HTN, who presented 3/24 to medicine for caustic ingestion (bleach) and now admitted for psychiatry for SI and depression. Patient discharged 3/27. Patient had ingested bleach due to concern that he had cause the COVID 19 epidemic. Admission consisted of GI consultation for hematemesis which resolved and now is on PPI BID. CT showed esophageal wall thickening w/o perf. Patient admission w/o airway compromise. Diet was advanced. No EGD required per GI given resolution of symptoms. Course complicated by hypernatremia, per poison control likely 2/2 to sodium hypochlorite ingestion. Resolved on discharge. Course further complicated by coin ingestion. Coins had not passed prior to discharge and were located in the ascending colon on abdominal film. No intervention required.     PAST MEDICAL/SURGICAL HISTORY  Bipolar disorder w/ one prior psych admission for suicide attempt   HTN  S/p total knee replacement right  h/o hernia repair     Home Medications:  amLODIPine 5 mg oral tablet: 1 tab(s) orally once a day (27 Mar 2020 16:58)  ARIPiprazole 10 mg oral tablet: 1 tab(s) orally once a day (27 Mar 2020 16:58)  escitalopram 20 mg oral tablet: 1 tab(s) orally once a day (26 Mar 2020 07:34)  pantoprazole 40 mg oral delayed release tablet: 1 tab(s) orally once a day (before a meal) (27 Mar 2020 16:58)  traZODone 50 mg oral tablet: 1 tab(s) orally 2 times a day (25 Mar 2020 10:59)  trimethobenzamide 100 mg/mL intramuscular solution: 2 milliliter(s) intramuscular every 6 hours, As needed, Nausea (26 Mar 2020 07:34)    Allergies  No Known Allergies  Intolerances    FAMILY HISTORY:  FH: schizophrenia    Social History:   lives with his partner Noah. No smoking, no drinking, no illicit drug use.     ROS: 12 point review of systems otherwise negative see HPI above.     VITAL SIGNS:  T(F): 98.4 (03-27-20 @ 19:25), Max: 98.4 (03-27-20 @ 19:25)  HR: 96 (03-27-20 @ 19:25) (83 - 96)  BP: 121/78 (03-27-20 @ 19:25) (121/78 - 143/80)  BP(mean): --  RR: 20 (03-27-20 @ 19:25) (17 - 20)  SpO2: 97% (03-27-20 @ 19:25) (97% - 98%)    PHYSICAL EXAM:  Constitutional: WDWN resting comfortably in bed; NAD  HEENT: NC/AT, PERRL, EOMI, anicteric sclera, no nasal discharge; uvula midline, no oropharyngeal erythema or exudates; MMM  Neck: supple; no JVD or thyromegaly  Respiratory: CTA B/L; no W/R/R, no retractions  Cardiac: +S1/S2; RRR; no M/R/G; PMI non-displaced  Gastrointestinal: soft, NT/ND; no rebound or guarding; +BSx4  Genitourinary: normal external genitalia  Back: spine midline, no bony tenderness or step-offs; no CVAT B/L  Extremities: WWP, no clubbing or cyanosis; no peripheral edema  Musculoskeletal: NROM x4; no joint swelling, tenderness or erythema  Vascular: 2+ radial, femoral, DP/PT pulses B/L  Dermatologic: skin warm, dry and intact; no rashes, wounds, or scars  Lymphatic: no submandibular or cervical LAD  Neurologic: AAOx3; CNII-XII grossly intact; no focal deficits  Psychiatric: affect and characteristics of appearance, verbalizations, behaviors are appropriate, denies SI/HI/AH/VH    LABS:     03-27    145  |  109<H>  |  21  ----------------------------<  126<H>  4.0   |  27  |  0.92    Ca    8.6      27 Mar 2020 08:33  Mg     2.0     03-27    CT Chest 3/24:   1. Diffuse esophageal wall thickening likely esophagitis in setting of caustic ingestion. No esophageal perforation. No pneumomediastinum.  2. Ground glass opacities seen in the right upper and lower lobe. Differential includes aspiration, edema, hemorrhage, infection.     X-ray Abdomen 3/27:  Upright and supine imaging of the abdomen demonstrates no evidence of pneumoperitoneum. There is a nonobstructive bowel gas pattern.  2 Rounded radiopaque structures are noted in the region of the terminal ileum/cecum unchanged in position. Marked degenerative changes of the lumbosacral spine multiple calcified phleboliths are noted in the lower pelvic region. Mild thoracolumbar scoliosis.    X-ray Abdomen 3/26:   There is a circular metallic object/objects overlying the ascending colon in the right lower quadrant (possibly 2 pennies). The bowel gas pattern is normal. No evidence of bowel obstruction. The visualized osseous structures and overlying soft tissues demonstrate no acute abnormality. Degenerative changes are noted in the spine.  Impression:  Circular metallic object/objects overlying the ascending colon in the right lower quadrant, possibly 2 pennies. No bowel obstruction. Would obtain follow-up radiographs to ensure passage of the foreign bodies.    EKG: 3/24   Diagnosis Line Sinus rhythm  Prolonged     Inpatient Meds:   MEDICATIONS  (STANDING):  amLODIPine   Tablet 5 milliGRAM(s) Oral daily  ARIPiprazole 10 milliGRAM(s) Oral daily  escitalopram 20 milliGRAM(s) Oral daily  pantoprazole    Tablet 40 milliGRAM(s) Oral before breakfast  MEDICATIONS  (PRN):  acetaminophen   Tablet .. 650 milliGRAM(s) Oral every 6 hours PRN Moderate Pain (4 - 6)  LORazepam     Tablet 1 milliGRAM(s) Oral every 6 hours PRN Agitation  melatonin 5 milliGRAM(s) Oral at bedtime PRN Insomnia SUMIT ROSADO  70y  Male  CC: Patient is a 70 year old man who presented 3/24 with a chief complaint of caustic ingestion (bleach and hematemesis) admitted to psychiatry for SI and depression.     HPI: Patient is a 70 year old man with unclear psych history (presumed bipolar disorder per psych), HTN, who presented 3/24 to medicine for caustic ingestion (bleach) and now admitted for psychiatry for SI and depression. Patient discharged 3/27. Patient had ingested bleach due to concern that he had cause the COVID 19 epidemic. Admission consisted of GI consultation for hematemesis which resolved and now is on PPI BID. CT showed esophageal wall thickening w/o perf. Patient admission w/o airway compromise. Diet was advanced. No EGD required per GI given resolution of symptoms. Course complicated by hypernatremia, per poison control likely 2/2 to sodium hypochlorite ingestion. Resolved on discharge. Course further complicated by coin ingestion. Coins had not passed prior to discharge and were located in the ascending colon on abdominal film. No intervention required.     PAST MEDICAL/SURGICAL HISTORY  Bipolar disorder w/ one prior psych admission for suicide attempt   HTN  S/p total knee replacement right  h/o hernia repair     Home Medications:  amLODIPine 5 mg oral tablet: 1 tab(s) orally once a day (27 Mar 2020 16:58)  ARIPiprazole 10 mg oral tablet: 1 tab(s) orally once a day (27 Mar 2020 16:58)  escitalopram 20 mg oral tablet: 1 tab(s) orally once a day (26 Mar 2020 07:34)  pantoprazole 40 mg oral delayed release tablet: 1 tab(s) orally once a day (before a meal) (27 Mar 2020 16:58)  traZODone 50 mg oral tablet: 1 tab(s) orally 2 times a day (25 Mar 2020 10:59)  trimethobenzamide 100 mg/mL intramuscular solution: 2 milliliter(s) intramuscular every 6 hours, As needed, Nausea (26 Mar 2020 07:34)    Allergies  No Known Allergies  Intolerances    FAMILY HISTORY:  FH: schizophrenia    Social History:   lives with his partner Noah. No smoking, no drinking, no illicit drug use.     ROS: 12 point review of systems otherwise negative see HPI above.     VITAL SIGNS:  T(F): 98.4 (03-27-20 @ 19:25), Max: 98.4 (03-27-20 @ 19:25)  HR: 96 (03-27-20 @ 19:25) (83 - 96)  BP: 121/78 (03-27-20 @ 19:25) (121/78 - 143/80)  BP(mean): --  RR: 20 (03-27-20 @ 19:25) (17 - 20)  SpO2: 97% (03-27-20 @ 19:25) (97% - 98%)    PHYSICAL EXAM:  Constitutional: WDWN resting comfortably in bed; NAD  HEENT: NC/AT, PERRL, EOMI, anicteric sclera, no nasal discharge; uvula midline, no oropharyngeal erythema or exudates; MMM  Neck: supple; no JVD or thyromegaly  Respiratory: CTA B/L; no W/R/R, no retractions  Cardiac: +S1/S2; RRR; no M/R/G; PMI non-displaced  Gastrointestinal: soft, NT/ND; no rebound or guarding; +BSx4  Genitourinary: normal external genitalia  Back: spine midline, no bony tenderness or step-offs; no CVAT B/L  Extremities: WWP, no clubbing or cyanosis; no peripheral edema  Musculoskeletal: NROM x4; no joint swelling, tenderness or erythema  Vascular: 2+ radial, femoral, DP/PT pulses B/L  Dermatologic: skin warm, dry and intact; no rashes, wounds, or scars  Lymphatic: no submandibular or cervical LAD  Neurologic: AAOx3; CNII-XII grossly intact; no focal deficits  Psychiatric: affect and characteristics of appearance, verbalizations, behaviors are appropriate, denies SI/HI/AH/VH    LABS:     03-27    145  |  109<H>  |  21  ----------------------------<  126<H>  4.0   |  27  |  0.92    Ca    8.6      27 Mar 2020 08:33  Mg     2.0     03-27    CT Chest 3/24:   1. Diffuse esophageal wall thickening likely esophagitis in setting of caustic ingestion. No esophageal perforation. No pneumomediastinum.  2. Ground glass opacities seen in the right upper and lower lobe. Differential includes aspiration, edema, hemorrhage, infection.     X-ray Abdomen 3/27:  Upright and supine imaging of the abdomen demonstrates no evidence of pneumoperitoneum. There is a nonobstructive bowel gas pattern.  2 Rounded radiopaque structures are noted in the region of the terminal ileum/cecum unchanged in position. Marked degenerative changes of the lumbosacral spine multiple calcified phleboliths are noted in the lower pelvic region. Mild thoracolumbar scoliosis.    X-ray Abdomen 3/26:   There is a circular metallic object/objects overlying the ascending colon in the right lower quadrant (possibly 2 pennies). The bowel gas pattern is normal. No evidence of bowel obstruction. The visualized osseous structures and overlying soft tissues demonstrate no acute abnormality. Degenerative changes are noted in the spine.  Impression:  Circular metallic object/objects overlying the ascending colon in the right lower quadrant, possibly 2 pennies. No bowel obstruction. Would obtain follow-up radiographs to ensure passage of the foreign bodies.    EKG: 3/24   Diagnosis Line Sinus rhythm  Prolonged     Inpatient Meds:   MEDICATIONS  (STANDING):  amLODIPine   Tablet 5 milliGRAM(s) Oral daily  ARIPiprazole 10 milliGRAM(s) Oral daily  escitalopram 20 milliGRAM(s) Oral daily  pantoprazole    Tablet 40 milliGRAM(s) Oral before breakfast  MEDICATIONS  (PRN):  acetaminophen   Tablet .. 650 milliGRAM(s) Oral every 6 hours PRN Moderate Pain (4 - 6)  LORazepam     Tablet 1 milliGRAM(s) Oral every 6 hours PRN Agitation  melatonin 5 milliGRAM(s) Oral at bedtime PRN Insomnia    Patient is a 70 man with likely bipolar disorder (s/p suicide attempt x 2) s/p medicine admission for hematemesis in the setting of caustic ingestion complicated by hypernatremia from caustic ingestion s/p resolution of both. Patient course complicated further by blunt objection ingestion (likely coins). Patient since discharged to psychiatry for suicide attempt. Medicine consulted for continued follow up for medical impact of his ingestions.   #Toxic Ingestion (bleach) complicated by hematemesis  Patient seen by Gastroenterology during admission for toxic ingestion (bleach) resulting in hematemesis. CT chest showing esophageal thickening w/o perf. Diet advanced and deemed medically stable prior to discharge.  - Please increase Protonix to 40mg BID (recommended by GI)   - patient hematemesis resolved on discharge   #Blunt Object Ingestion  Patient with reported coin ingestion prior to bleach ingestion. Coins noted in ascending colon 3/27 on abdominal x-ray. Blunt objects can typically take 4-6 days to pass. Patient s/p Golytely with persistence of coins in ascending colon on discharge.    - monitor for passage of coins  - typically once past stomach does not require surgical removal  - may require repeat x-ray abdomen if does not pass by 3/30---- Will discuss w/ medicine attending   #Hypernatremia  Patient with Na 145 on discharge. Peak sodium 153. Hypernatremia attributed to hypochlorite ingestion.   - repeat BMP today if stable no further Na measurements required   #Prolonged QTC ~490  Patient with noted prolonged QTC during admission for caustic ingestion. QTC ~490.   - avoid QT prolonging meds   #HTN   Patient with history of HTN on Norvasc 5mg.  - c/w home med BP at goal 121/78   #Bipolar w/ Suicide Attempt  - management per psychiatry     Will Continue To Follow SUMIT ROSADO  70y  Male  CC: Patient is a 70 year old man who presented 3/24 with a chief complaint of caustic ingestion (bleach and hematemesis) admitted to psychiatry for SI and depression.     HPI: Patient is a 70 year old man with unclear psych history (presumed bipolar disorder per psych), HTN, who presented 3/24 to medicine for caustic ingestion (bleach) and now admitted for psychiatry for SI and depression. Patient discharged 3/27. Patient had ingested bleach due to concern that he had cause the COVID 19 epidemic. Admission consisted of GI consultation for hematemesis which resolved and now is on PPI BID. CT showed esophageal wall thickening w/o perf. Patient admission w/o airway compromise. Diet was advanced. No EGD required per GI given resolution of symptoms. Course complicated by hypernatremia, per poison control likely 2/2 to sodium hypochlorite ingestion. Resolved on discharge. Course further complicated by coin ingestion. Coins had not passed prior to discharge and were located in the ascending colon on abdominal film. No intervention required.     PAST MEDICAL/SURGICAL HISTORY  Bipolar disorder w/ one prior psych admission for suicide attempt   HTN  S/p total knee replacement right  h/o hernia repair     Home Medications:  amLODIPine 5 mg oral tablet: 1 tab(s) orally once a day (27 Mar 2020 16:58)  ARIPiprazole 10 mg oral tablet: 1 tab(s) orally once a day (27 Mar 2020 16:58)  escitalopram 20 mg oral tablet: 1 tab(s) orally once a day (26 Mar 2020 07:34)  pantoprazole 40 mg oral delayed release tablet: 1 tab(s) orally once a day (before a meal) (27 Mar 2020 16:58)  traZODone 50 mg oral tablet: 1 tab(s) orally 2 times a day (25 Mar 2020 10:59)  trimethobenzamide 100 mg/mL intramuscular solution: 2 milliliter(s) intramuscular every 6 hours, As needed, Nausea (26 Mar 2020 07:34)    Allergies  No Known Allergies  Intolerances    FAMILY HISTORY:  FH: schizophrenia    Social History:   lives with his partner Noah. No smoking, no drinking, no illicit drug use.     ROS: 12 point review of systems otherwise negative see HPI above.     VITAL SIGNS:  T(F): 98.4 (03-27-20 @ 19:25), Max: 98.4 (03-27-20 @ 19:25)  HR: 96 (03-27-20 @ 19:25) (83 - 96)  BP: 121/78 (03-27-20 @ 19:25) (121/78 - 143/80)  BP(mean): --  RR: 20 (03-27-20 @ 19:25) (17 - 20)  SpO2: 97% (03-27-20 @ 19:25) (97% - 98%)    PHYSICAL EXAM:  Constitutional: WDWN resting comfortably in bed; NAD  HEENT: NC/AT, PERRL, EOMI, anicteric sclera, no nasal discharge; uvula midline, no oropharyngeal erythema or exudates; MMM  Neck: supple; no JVD or thyromegaly  Respiratory: CTA B/L; no W/R/R, no retractions  Cardiac: +S1/S2; RRR; no M/R/G; PMI non-displaced  Gastrointestinal: soft, NT/ND; no rebound or guarding; +BSx4  Genitourinary: normal external genitalia  Back: spine midline, no bony tenderness or step-offs; no CVAT B/L  Extremities: WWP, no clubbing or cyanosis; no peripheral edema  Musculoskeletal: NROM x4; no joint swelling, tenderness or erythema  Vascular: 2+ radial, femoral, DP/PT pulses B/L  Dermatologic: skin warm, dry and intact; no rashes, wounds, or scars  Lymphatic: no submandibular or cervical LAD  Neurologic: AAOx3; CNII-XII grossly intact; no focal deficits  Psychiatric: affect and characteristics of appearance, verbalizations, behaviors are appropriate, denies SI/HI/AH/VH    LABS:     03-27    145  |  109<H>  |  21  ----------------------------<  126<H>  4.0   |  27  |  0.92    Ca    8.6      27 Mar 2020 08:33  Mg     2.0     03-27    CT Chest 3/24:   1. Diffuse esophageal wall thickening likely esophagitis in setting of caustic ingestion. No esophageal perforation. No pneumomediastinum.  2. Ground glass opacities seen in the right upper and lower lobe. Differential includes aspiration, edema, hemorrhage, infection.     X-ray Abdomen 3/27:  Upright and supine imaging of the abdomen demonstrates no evidence of pneumoperitoneum. There is a nonobstructive bowel gas pattern.  2 Rounded radiopaque structures are noted in the region of the terminal ileum/cecum unchanged in position. Marked degenerative changes of the lumbosacral spine multiple calcified phleboliths are noted in the lower pelvic region. Mild thoracolumbar scoliosis.    X-ray Abdomen 3/26:   There is a circular metallic object/objects overlying the ascending colon in the right lower quadrant (possibly 2 pennies). The bowel gas pattern is normal. No evidence of bowel obstruction. The visualized osseous structures and overlying soft tissues demonstrate no acute abnormality. Degenerative changes are noted in the spine.  Impression:  Circular metallic object/objects overlying the ascending colon in the right lower quadrant, possibly 2 pennies. No bowel obstruction. Would obtain follow-up radiographs to ensure passage of the foreign bodies.    EKG: 3/24   Diagnosis Line Sinus rhythm  Prolonged     Inpatient Meds:   MEDICATIONS  (STANDING):  amLODIPine   Tablet 5 milliGRAM(s) Oral daily  ARIPiprazole 10 milliGRAM(s) Oral daily  escitalopram 20 milliGRAM(s) Oral daily  pantoprazole    Tablet 40 milliGRAM(s) Oral before breakfast  MEDICATIONS  (PRN):  acetaminophen   Tablet .. 650 milliGRAM(s) Oral every 6 hours PRN Moderate Pain (4 - 6)  LORazepam     Tablet 1 milliGRAM(s) Oral every 6 hours PRN Agitation  melatonin 5 milliGRAM(s) Oral at bedtime PRN Insomnia    Patient is a 70 man with likely bipolar disorder (s/p suicide attempt x 2) s/p medicine admission for hematemesis in the setting of caustic ingestion complicated by hypernatremia from caustic ingestion s/p resolution of both. Patient course complicated further by blunt objection ingestion (likely coins). Patient since discharged to psychiatry for suicide attempt. Medicine consulted for continued follow up for medical impact of his ingestions.   #Toxic Ingestion (bleach) complicated by hematemesis  Patient seen by Gastroenterology during admission for toxic ingestion (bleach) resulting in hematemesis. CT chest showing esophageal thickening w/o perf. Diet advanced and deemed medically stable prior to discharge.  - Please increase Protonix to 40mg BID (recommended by GI)   - patient hematemesis resolved on discharge   #Blunt Object Ingestion  Patient with reported coin ingestion prior to bleach ingestion. Coins noted in ascending colon 3/27 on abdominal x-ray. Blunt objects can typically take 4-6 days to pass, but sometimes can take weeks. Patient s/p Golytely with persistence of coins in ascending colon on discharge.    - monitor for passage of coins  - typically once past stomach does not require surgical removal  - may require repeat x-ray abdomen if does not pass by 4/3---- Will discuss w/ attending   #Hypernatremia  Patient with Na 145 on discharge. Peak sodium 153. Hypernatremia attributed to hypochlorite ingestion.   - repeat BMP today if stable no further Na measurements required   #Prolonged QTC ~490  Patient with noted prolonged QTC during admission for caustic ingestion. QTC ~490.   - avoid QT prolonging meds   #HTN   Patient with history of HTN on Norvasc 5mg.  - c/w home med BP at goal 121/78   #Bipolar w/ Suicide Attempt  - management per psychiatry     Will Continue To Follow SUMIT ROSADO  70y  Male  CC: Patient is a 70 year old man who presented 3/24 with a chief complaint of caustic ingestion (bleach and hematemesis) admitted to psychiatry for SI and depression.     HPI: Patient is a 70 year old man with unclear psych history (presumed bipolar disorder per psych), HTN, who presented 3/24 to medicine for caustic ingestion (bleach) and now admitted for psychiatry for SI and depression. Patient discharged 3/27. Patient had ingested bleach due to concern that he had cause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

## 2020-03-28 NOTE — BEHAVIORAL HEALTH ASSESSMENT NOTE - OTHER
sitting in chair as per chart, "feeling guilt for provoking the current covid epidemic" not assessed sitting in chair, uses walker
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
## 2020-03-28 NOTE — BEHAVIORAL HEALTH ASSESSMENT NOTE - NSBHCHARTREVIEWVS_PSY_A_CORE FT
Vital Signs Last 24 Hrs  T(C): 36.6 (28 Mar 2020 10:00), Max: 36.9 (27 Mar 2020 19:25)  T(F): 97.9 (28 Mar 2020 10:00), Max: 98.4 (27 Mar 2020 19:25)  HR: 96 (28 Mar 2020 10:00) (83 - 96)  BP: 128/80 (28 Mar 2020 10:00) (121/78 - 143/80)  BP(mean): --  RR: 20 (28 Mar 2020 10:00) (17 - 20)  SpO2: 99% (28 Mar 2020 10:00) (97% - 99%)
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
## 2020-03-28 NOTE — BEHAVIORAL HEALTH ASSESSMENT NOTE - SUICIDE RISK FACTORS
Agitation/Severe Anxiety/Panic/Psychotic disorder current/past/Impulsivity/Insomnia/Hopelessness or despair

## 2020-03-28 NOTE — BEHAVIORAL HEALTH ASSESSMENT NOTE - NSBHSOCIALHXDETAILSFT_PSY_A_CORE
As per chart, patient is a very talented , currently retired. Has been in a relationship with Grady for the last 40 years. He has a brother and a sister.

## 2020-03-28 NOTE — CHART NOTE - NSCHARTNOTEFT_GEN_A_CORE
SUMIT ROSADO  70y  Male  CC: Patient is a 70y old  Male who presented 3/24 with a chief complaint of caustic ingestion (bleach) and hematemesis discharged on 3/27 to psychiatry inpatient for SI and depression.   HPI:           PAST MEDICAL/SURGICAL HISTORY  PAST MEDICAL & SURGICAL HISTORY:  Schizophrenia  Bipolar 1 disorder  Depression  S/P total knee replacement, right  H/O hernia repair      REVIEW OF SYSTEMS:  CONSTITUTIONAL: No fever, weight loss, or fatigue  EYES: No eye pain, visual disturbances, or discharge  ENMT:  No difficulty hearing, tinnitus, vertigo; No sinus or throat pain  NECK: No pain or stiffness  BREASTS: No pain, masses, or nipple discharge  RESPIRATORY: No cough, wheezing, chills or hemoptysis; No shortness of breath  CARDIOVASCULAR: No chest pain, palpitations, dizziness, or leg swelling  GASTROINTESTINAL: No abdominal or epigastric pain. No nausea, vomiting, or hematemesis; No diarrhea or constipation. No melena or hematochezia.  GENITOURINARY: No dysuria, frequency, hematuria, or incontinence  NEUROLOGICAL: No headaches, memory loss, loss of strength, numbness, or tremors  SKIN: No itching, burning, rashes, or lesions   LYMPH NODES: No enlarged glands  ENDOCRINE: No heat or cold intolerance; No hair loss  MUSCULOSKELETAL: No joint pain or swelling; No muscle, back, or extremity pain  PSYCHIATRIC: No depression, anxiety, mood swings, or difficulty sleeping  HEME/LYMPH: No easy bruising, or bleeding gums  ALLERY AND IMMUNOLOGIC: No hives or eczema    T(C): 36.9 (03-27-20 @ 19:25), Max: 36.9 (03-27-20 @ 19:25)  HR: 96 (03-27-20 @ 19:25) (83 - 96)  BP: 121/78 (03-27-20 @ 19:25) (121/78 - 143/80)  RR: 20 (03-27-20 @ 19:25) (17 - 20)  SpO2: 97% (03-27-20 @ 19:25) (97% - 98%)  Wt(kg): --Vital Signs Last 24 Hrs  T(C): 36.9 (27 Mar 2020 19:25), Max: 36.9 (27 Mar 2020 19:25)  T(F): 98.4 (27 Mar 2020 19:25), Max: 98.4 (27 Mar 2020 19:25)  HR: 96 (27 Mar 2020 19:25) (83 - 96)  BP: 121/78 (27 Mar 2020 19:25) (121/78 - 143/80)  BP(mean): --  RR: 20 (27 Mar 2020 19:25) (17 - 20)  SpO2: 97% (27 Mar 2020 19:25) (97% - 98%)    PHYSICAL EXAM:  GENERAL: NAD, well-groomed, well-developed  HEAD:  Atraumatic, Normocephalic  EYES: EOMI, PERRLA, conjunctiva and sclera clear  ENMT: No tonsillar erythema, exudates, or enlargement; Moist mucous membranes, Good dentition, No lesions  NECK: Supple, No JVD, Normal thyroid  NERVOUS SYSTEM:  Alert & Oriented X3, Good concentration; Motor Strength 5/5 B/L upper and lower extremities; DTRs 2+ intact and symmetric  CHEST/LUNG: Clear to percussion bilaterally; No rales, rhonchi, wheezing, or rubs  HEART: Regular rate and rhythm; No murmurs, rubs, or gallops  ABDOMEN: Soft, Nontender, Nondistended; Bowel sounds present  EXTREMITIES:  2+ Peripheral Pulses, No clubbing, cyanosis, or edema  LYMPH: No lymphadenopathy noted  SKIN: No rashes or lesions

## 2020-03-28 NOTE — BEHAVIORAL HEALTH ASSESSMENT NOTE - SUICIDE PROTECTIVE FACTORS
Identifies reasons for living/Supportive social network of family or friends/Positive therapeutic relationships/Ability to cope with stress/Responsibility to family and others
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
## 2020-03-29 DIAGNOSIS — T14.91XA SUICIDE ATTEMPT, INITIAL ENCOUNTER: ICD-10-CM

## 2020-03-29 PROCEDURE — 99231 SBSQ HOSP IP/OBS SF/LOW 25: CPT

## 2020-03-29 RX ADMIN — ARIPIPRAZOLE 10 MILLIGRAM(S): 15 TABLET ORAL at 12:46

## 2020-03-29 RX ADMIN — Medication 5 MILLIGRAM(S): at 21:40

## 2020-03-29 RX ADMIN — PANTOPRAZOLE SODIUM 40 MILLIGRAM(S): 20 TABLET, DELAYED RELEASE ORAL at 12:47

## 2020-03-29 RX ADMIN — PANTOPRAZOLE SODIUM 40 MILLIGRAM(S): 20 TABLET, DELAYED RELEASE ORAL at 21:40

## 2020-03-29 RX ADMIN — AMLODIPINE BESYLATE 5 MILLIGRAM(S): 2.5 TABLET ORAL at 12:47

## 2020-03-29 RX ADMIN — Medication 650 MILLIGRAM(S): at 23:42

## 2020-03-29 RX ADMIN — ESCITALOPRAM OXALATE 20 MILLIGRAM(S): 10 TABLET, FILM COATED ORAL at 12:46

## 2020-03-29 NOTE — PROGRESS NOTE BEHAVIORAL HEALTH - NSBHFUPINTERVALCCFT_PSY_A_CORE
70 year old male with recent history of paranoid and bizarre ideation who reportedly ingested bleach in a suicide attempt currently on CO status for safety as well as potential fall risk.

## 2020-03-30 DIAGNOSIS — R79.89 OTHER SPECIFIED ABNORMAL FINDINGS OF BLOOD CHEMISTRY: ICD-10-CM

## 2020-03-30 DIAGNOSIS — E66.9 OBESITY, UNSPECIFIED: ICD-10-CM

## 2020-03-30 DIAGNOSIS — D72.829 ELEVATED WHITE BLOOD CELL COUNT, UNSPECIFIED: ICD-10-CM

## 2020-03-30 DIAGNOSIS — T30.4 CORROSION OF UNSPECIFIED BODY REGION, UNSPECIFIED DEGREE: ICD-10-CM

## 2020-03-30 DIAGNOSIS — E87.0 HYPEROSMOLALITY AND HYPERNATREMIA: ICD-10-CM

## 2020-03-30 DIAGNOSIS — F31.9 BIPOLAR DISORDER, UNSPECIFIED: ICD-10-CM

## 2020-03-30 DIAGNOSIS — Z96.651 PRESENCE OF RIGHT ARTIFICIAL KNEE JOINT: ICD-10-CM

## 2020-03-30 DIAGNOSIS — F29 UNSPECIFIED PSYCHOSIS NOT DUE TO A SUBSTANCE OR KNOWN PHYSIOLOGICAL CONDITION: ICD-10-CM

## 2020-03-30 DIAGNOSIS — T18.4XXA FOREIGN BODY IN COLON, INITIAL ENCOUNTER: ICD-10-CM

## 2020-03-30 DIAGNOSIS — F32.9 MAJOR DEPRESSIVE DISORDER, SINGLE EPISODE, UNSPECIFIED: ICD-10-CM

## 2020-03-30 DIAGNOSIS — F22 DELUSIONAL DISORDERS: ICD-10-CM

## 2020-03-30 DIAGNOSIS — K92.0 HEMATEMESIS: ICD-10-CM

## 2020-03-30 DIAGNOSIS — T28.6XXS: ICD-10-CM

## 2020-03-30 DIAGNOSIS — R93.89 ABNORMAL FINDINGS ON DIAGNOSTIC IMAGING OF OTHER SPECIFIED BODY STRUCTURES: ICD-10-CM

## 2020-03-30 DIAGNOSIS — T21.55XA: ICD-10-CM

## 2020-03-30 DIAGNOSIS — R73.9 HYPERGLYCEMIA, UNSPECIFIED: ICD-10-CM

## 2020-03-30 DIAGNOSIS — E87.2 ACIDOSIS: ICD-10-CM

## 2020-03-30 DIAGNOSIS — T28.6XXA CORROSION OF ESOPHAGUS, INITIAL ENCOUNTER: ICD-10-CM
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
## 2020-03-30 DIAGNOSIS — F39 UNSPECIFIED MOOD [AFFECTIVE] DISORDER: ICD-10-CM

## 2020-03-30 DIAGNOSIS — F31.5 BIPOLAR DISORDER, CURRENT EPISODE DEPRESSED, SEVERE, WITH PSYCHOTIC FEATURES: ICD-10-CM

## 2020-03-30 DIAGNOSIS — T54.92XA TOXIC EFFECT OF UNSPECIFIED CORROSIVE SUBSTANCE, INTENTIONAL SELF-HARM, INITIAL ENCOUNTER: ICD-10-CM

## 2020-03-30 DIAGNOSIS — I10 ESSENTIAL (PRIMARY) HYPERTENSION: ICD-10-CM

## 2020-03-30 DIAGNOSIS — T54.92XS: ICD-10-CM

## 2020-03-30 DIAGNOSIS — F25.0 SCHIZOAFFECTIVE DISORDER, BIPOLAR TYPE: ICD-10-CM

## 2020-03-30 DIAGNOSIS — T21.54XA: ICD-10-CM

## 2020-03-30 DIAGNOSIS — B19.20 UNSPECIFIED VIRAL HEPATITIS C WITHOUT HEPATIC COMA: ICD-10-CM

## 2020-03-30 DIAGNOSIS — J69.8 PNEUMONITIS DUE TO INHALATION OF OTHER SOLIDS AND LIQUIDS: ICD-10-CM

## 2020-03-30 PROCEDURE — 99233 SBSQ HOSP IP/OBS HIGH 50: CPT | Mod: 95

## 2020-03-30 PROCEDURE — 99232 SBSQ HOSP IP/OBS MODERATE 35: CPT

## 2020-03-30 RX ORDER — MIRTAZAPINE 45 MG/1
7.5 TABLET, ORALLY DISINTEGRATING ORAL AT BEDTIME
Refills: 0 | Status: DISCONTINUED | OUTPATIENT
Start: 2020-03-30 | End: 2020-04-02

## 2020-03-30 RX ADMIN — Medication 650 MILLIGRAM(S): at 11:04

## 2020-03-30 RX ADMIN — Medication 650 MILLIGRAM(S): at 00:30

## 2020-03-30 RX ADMIN — ARIPIPRAZOLE 10 MILLIGRAM(S): 15 TABLET ORAL at 10:58

## 2020-03-30 RX ADMIN — PANTOPRAZOLE SODIUM 40 MILLIGRAM(S): 20 TABLET, DELAYED RELEASE ORAL at 10:58

## 2020-03-30 RX ADMIN — Medication 1 APPLICATION(S): at 17:57

## 2020-03-30 RX ADMIN — MIRTAZAPINE 7.5 MILLIGRAM(S): 45 TABLET, ORALLY DISINTEGRATING ORAL at 21:43

## 2020-03-30 RX ADMIN — ESCITALOPRAM OXALATE 20 MILLIGRAM(S): 10 TABLET, FILM COATED ORAL at 10:59

## 2020-03-30 RX ADMIN — AMLODIPINE BESYLATE 5 MILLIGRAM(S): 2.5 TABLET ORAL at 10:59

## 2020-03-30 RX ADMIN — PANTOPRAZOLE SODIUM 40 MILLIGRAM(S): 20 TABLET, DELAYED RELEASE ORAL at 21:43

## 2020-03-30 RX ADMIN — Medication 650 MILLIGRAM(S): at 17:30

## 2020-03-30 NOTE — PROGRESS NOTE BEHAVIORAL HEALTH - PROBLEM SELECTOR PLAN 1
-C/w Lexapro 20mg daily  -C/w Abilify 10mg daily - consider increasing it to 15mg daily  -Add-on low-dose Mirtazapine for anti-emetic properties and sleep maintenance. May increase to 15mg qhs  - monitor QTC. Consider Latuda if switch from Abilify is needed  -1:1 observation needed while pt optimizes overall motor status and mood

## 2020-03-30 NOTE — PROGRESS NOTE BEHAVIORAL HEALTH - OTHER
somewhat flacid with psychomotor retardation noted sitting in chair, uses walker concrete and limited to salutation
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
## 2020-03-30 NOTE — PROGRESS NOTE BEHAVIORAL HEALTH - SUMMARY
70M with with unclear PPH, likely bipolar disorder, one prior psychiatric admission s/p SA, admitted to [facility] after hematemesis in the setting of caustic ingestion as a suicide attempt. As per chart, patient initially presented with low mood, high anxiety and distress secondary to paranoid delusions (related to thinking that he caused the Covid 19 epidemic).     Today, patient andrew not appear to be acutely psychotic . Patient is concerned with coin ingestion. Patient reports feeling safe on the unit. Patient denies any SI/HI/AVH. Still feeling somewhat anxious and c/o insomnia. Will add-on Mirtazapine and titrate medication dosages; considering add-on of Lithium and/or ECT.
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
## 2020-03-30 NOTE — PROGRESS NOTE BEHAVIORAL HEALTH - NSBHFUPADDHPIFT_PSY_A_CORE
Pt reports to have been feeling depressed/guilt for "having caused the Covid-19 outbreak", as he "had symptoms and did not tell anyone". He has been feeling down and worthless for many months, and he couldn't sleep or eat appropriately on the weeks that preceded his attempt to end his life. During that episode, his thoughts were racing "a million miles per hour" and he could not focus in a single subject. He currently feels his life is "empty, dark" but has not always been like this. There were episodes in a recent past that he felt "very energized, confident, like superman" in which he wouldn't need to sleep and have racing thoughts, increase in his activities and perceived ability to do things.    He feels grateful that he did not die by suicide and might have a chance to see his partner Grady arabella [phone].

## 2020-03-31 PROCEDURE — 99232 SBSQ HOSP IP/OBS MODERATE 35: CPT | Mod: 95

## 2020-03-31 PROCEDURE — 99232 SBSQ HOSP IP/OBS MODERATE 35: CPT

## 2020-03-31 RX ORDER — OLANZAPINE 15 MG/1
2.5 TABLET, FILM COATED ORAL ONCE
Refills: 0 | Status: COMPLETED | OUTPATIENT
Start: 2020-03-31 | End: 2020-03-31

## 2020-03-31 RX ADMIN — Medication 650 MILLIGRAM(S): at 10:19

## 2020-03-31 RX ADMIN — Medication 650 MILLIGRAM(S): at 14:18

## 2020-03-31 RX ADMIN — OLANZAPINE 2.5 MILLIGRAM(S): 15 TABLET, FILM COATED ORAL at 19:51

## 2020-03-31 RX ADMIN — MIRTAZAPINE 7.5 MILLIGRAM(S): 45 TABLET, ORALLY DISINTEGRATING ORAL at 21:13

## 2020-03-31 RX ADMIN — ARIPIPRAZOLE 10 MILLIGRAM(S): 15 TABLET ORAL at 10:24

## 2020-03-31 RX ADMIN — ESCITALOPRAM OXALATE 20 MILLIGRAM(S): 10 TABLET, FILM COATED ORAL at 10:24

## 2020-03-31 RX ADMIN — Medication 1 APPLICATION(S): at 21:15

## 2020-03-31 RX ADMIN — PANTOPRAZOLE SODIUM 40 MILLIGRAM(S): 20 TABLET, DELAYED RELEASE ORAL at 21:14

## 2020-03-31 RX ADMIN — AMLODIPINE BESYLATE 5 MILLIGRAM(S): 2.5 TABLET ORAL at 10:24

## 2020-03-31 RX ADMIN — Medication 1 APPLICATION(S): at 15:21

## 2020-03-31 RX ADMIN — Medication 1 MILLIGRAM(S): at 14:28

## 2020-03-31 RX ADMIN — Medication 650 MILLIGRAM(S): at 21:14

## 2020-03-31 RX ADMIN — PANTOPRAZOLE SODIUM 40 MILLIGRAM(S): 20 TABLET, DELAYED RELEASE ORAL at 10:19

## 2020-03-31 NOTE — PROGRESS NOTE BEHAVIORAL HEALTH - NSBHFUPIPCHARTREVFT_PSY_A_CORE
As per chart, patient is a "70M with  with unclear PPH, likely bipolar disorder, one prior psychiatric admission s/p SA, presenting to [facility] after hematemesis in the setting of caustic ingestion as a suicide attempt.  As per the patient, he has been feeling depressed, anxious and very guilty during the last few weeks in context of having thoughts that he triggered the current Covid 19 epidemic. Patient reports that he has been having Si for the last 1-2 weeks. Night before admissions to hospital he became very scared that his partner  (checked his pulse and couldn't find it). He went to the bathtub and started drinking from a bottle of bleach in a suicide attempt. As per his partner, patient has been very paranoid for the last 2-3 weeks making statements that he caused the epidemic and "they were being watched from across the hallway", " they are going to come in to get us and put us away". Patient was also having severe insomnia and high anxiety. Patient's partner states that last week he heard the patient making choking sounds and found him with a mouthful of coins (tried to choke himself). Patient was not taken to the ER at that time. Yesterday, Grady was woken up at 4am by the smell of bleach and found the patient in the bathtub covered in blood.  As per Grady, patient saw for 2 years a therapist (until the therapist moved to [place]). Patient never saw a psychiatrist. He is currently prescribed by PMD lexapro either 20mg or 30mg daily (unclear at this time) and trazodone 50mg po qhs."
As per chart, patient is a "70M with  with unclear PPH, likely bipolar disorder, one prior psychiatric admission s/p SA, presenting to [facility] after hematemesis in the setting of caustic ingestion as a suicide attempt.  As per the patient, he has been feeling depressed, anxious and very guilty during the last few weeks in context of having thoughts that he triggered the current Covid 19 epidemic. Patient reports that he has been having Si for the last 1-2 weeks. Night before admissions to hospital he became very scared that his partner  (checked his pulse and couldn't find it). He went to the bathtub and started drinking from a bottle of bleach in a suicide attempt. As per his partner, patient has been very paranoid for the last 2-3 weeks making statements that he caused the epidemic and "they were being watched from across the hallway", " they are going to come in to get us and put us away". Patient was also having severe insomnia and high anxiety. Patient's partner states that last week he heard the patient making choking sounds and found him with a mouthful of coins (tried to choke himself). Patient was not taken to the ER at that time. Yesterday, Grady was woken up at 4am by the smell of bleach and found the patient in the bathtub covered in blood.  As per Grady, patient saw for 2 years a therapist (until the therapist moved to [place]). Patient never saw a psychiatrist. He is currently prescribed by PMD lexapro either 20mg or 30mg daily (unclear at this time) and trazodone 50mg po qhs."
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
## 2020-03-31 NOTE — PROGRESS NOTE BEHAVIORAL HEALTH - SUMMARY
70M with  with unclear PPH, likely bipolar disorder, one prior psychiatric admission s/p SA, presenting to [facility] after hematemesis in the setting of caustic ingestion as a suicide attempt.  As per the patient, he has been feeling depressed, anxious and very guilty during the last few weeks in context of having thoughts that he triggered the current Covid 19 epidemic. Patient reports that he has been having Si for the last 1-2 weeks. Night before admissions to hospital he became very scared that his partner  (checked his pulse and couldn't find it). He went to the bathtub and started drinking from a bottle of bleach in a suicide attempt. As per his partner, patient has been very paranoid for the last 2-3 weeks making statements that he caused the epidemic and "they were being watched from across the hallway", " they are going to come in to get us and put us away". Patient was also having severe insomnia and high anxiety. Patient's partner states that last week he heard the patient making choking sounds and found him with a mouthful of coins (tried to choke himself). Patient was not taken to the ER at that time. Yesterday, Grady was woken up at 4am by the smell of bleach and found the patient in the bathtub covered in blood.  As per Grady, patient saw for 2 years a therapist (until the therapist moved to [place]). Patient never saw a psychiatrist. He is currently prescribed by PMD lexapro either 20mg or 30mg daily (unclear at this time) and trazodone 50mg po qhs."    ;;3/30:  interviewed with treating resident using remote per Psychiatric hospital policy during pandemic;  constricted;internally preoccupied; expressed remorse re suicide attempt;  however while knew the month and year did not know which hospital he was in and did not seen to be aware that he was on a psychiatric unit;  fair adls; constricted; somewhat sad affect; slow clear speech; fair eye contact; fair adls.  Azulfidine for back burns;  celexa 10mg augmented with Abilify 20mg for depression and Remeron 7.5mg at night for insomnia;  Norvasc 5mg po daily for HTN.   ;;3/31:; nury bed; back hurst; sleep ok;  no s/h i/i/p or avh;  speech clear; apartment building with an elevator;  wants to be home.  not eating or drinking;  to add Zyprexa for pychotic depression.  Will give Zyprexxa 2.5mg po stat for social and emotional withdrawal.   Interviewed remoten in presence of staff RN as per Psychiatric hospital policy in presence of pandemic  Patient is a candidate to return to med surgical unit should the unit need to decant. ... that his partner  (checked his pulse and couldn't find it). He went to the bathtub and started drinking from a bottle of bleach in a suicide attempt. As per his partner, patient has been very paranoid for the last 2-3 weeks making statements that he caused the epidemic and "they were being watched from across the hallway", " they are going to come in to get us and put us away". Patient was also having severe insomnia and high anxiety. Patient's partner states that last week he heard the patient making choking sounds and found him with a mouthful of coins (tried to choke himself). Patient was not taken to the ER at that time. Yesterday, Grady was woken up at 4am by the smell of bleach and found the patient in the bathtub covered in blood.  As per Grady, patient saw for 2 years a therapist (until the therapist moved to [place]). Patient never saw a psychiatrist. He is currently prescribed by PMD lexapro either 20mg or 30mg daily (unclear at this time) and trazodone 50mg po qhs."    ;;3/30:  interviewed with treating resident using remote per Psychiatric hospital policy during pandemic;  constricted; internally preoccupied; expressed remorse re suicide attempt;  however while knew the month and year did not know which hospital he was in and did not seen to be aware that he was on a psychiatric unit;  fair adls; constricted; somewhat sad affect; slow clear speech; fair eye contact; fair adls.  Azulfidine for back burns;  celexa 10mg augmented with Abilify 20mg for depression and Remeron 7.5mg at night for insomnia;  Norvasc 5mg po daily for HTN.   ;;3/31:; nury bed; back hurst; sleep ok;  no s/h i/i/p or avh;  speech clear; apartment building with an elevator;  wants to be home.  not eating or drinking;  to add Zyprexa for psychotic depression.  Will give Zyprexa 2.5mg po stat for social and emotional withdrawal.   Interviewed remote in presence of staff RN as per Psychiatric hospital policy in presence of pandemic  Patient is a candidate to return to med surgical unit should the unit need to decant.

## 2020-03-31 NOTE — PROGRESS NOTE BEHAVIORAL HEALTH - OTHER
concrete and limited to salutation somewhat flacid with psychomotor retardation noted sitting in chair, uses walker

## 2020-04-01 LAB
CHOLEST SERPL-MCNC: 116 MG/DL — SIGNIFICANT CHANGE UP (ref 10–199)
HBA1C BLD-MCNC: 5.5 % — SIGNIFICANT CHANGE UP (ref 4–5.6)
HDLC SERPL-MCNC: 48 MG/DL — SIGNIFICANT CHANGE UP
LIPID PNL WITH DIRECT LDL SERPL: 44 MG/DL — SIGNIFICANT CHANGE UP
SARS-COV-2 RNA SPEC QL NAA+PROBE: SIGNIFICANT CHANGE UP
TOTAL CHOLESTEROL/HDL RATIO MEASUREMENT: 2.4 RATIO — LOW (ref 3.4–9.6)
TRIGL SERPL-MCNC: 118 MG/DL — SIGNIFICANT CHANGE UP (ref 10–149)

## 2020-04-01 PROCEDURE — 99232 SBSQ HOSP IP/OBS MODERATE 35: CPT | Mod: 95

## 2020-04-01 PROCEDURE — 99232 SBSQ HOSP IP/OBS MODERATE 35: CPT

## 2020-04-01 PROCEDURE — 99238 HOSP IP/OBS DSCHRG MGMT 30/<: CPT | Mod: 25,95

## 2020-04-01 RX ORDER — MAGNESIUM HYDROXIDE 400 MG/1
30 TABLET, CHEWABLE ORAL DAILY
Refills: 0 | Status: DISCONTINUED | OUTPATIENT
Start: 2020-04-01 | End: 2020-04-02

## 2020-04-01 RX ORDER — ARIPIPRAZOLE 15 MG/1
15 TABLET ORAL DAILY
Refills: 0 | Status: DISCONTINUED | OUTPATIENT
Start: 2020-04-01 | End: 2020-04-02

## 2020-04-01 RX ADMIN — ARIPIPRAZOLE 10 MILLIGRAM(S): 15 TABLET ORAL at 10:43

## 2020-04-01 RX ADMIN — Medication 1 APPLICATION(S): at 10:43

## 2020-04-01 RX ADMIN — ESCITALOPRAM OXALATE 20 MILLIGRAM(S): 10 TABLET, FILM COATED ORAL at 10:43

## 2020-04-01 RX ADMIN — AMLODIPINE BESYLATE 5 MILLIGRAM(S): 2.5 TABLET ORAL at 10:43

## 2020-04-01 RX ADMIN — MIRTAZAPINE 7.5 MILLIGRAM(S): 45 TABLET, ORALLY DISINTEGRATING ORAL at 21:07

## 2020-04-01 RX ADMIN — PANTOPRAZOLE SODIUM 40 MILLIGRAM(S): 20 TABLET, DELAYED RELEASE ORAL at 21:07

## 2020-04-01 RX ADMIN — Medication 650 MILLIGRAM(S): at 22:48

## 2020-04-01 RX ADMIN — PANTOPRAZOLE SODIUM 40 MILLIGRAM(S): 20 TABLET, DELAYED RELEASE ORAL at 10:43

## 2020-04-01 RX ADMIN — Medication 5 MILLIGRAM(S): at 22:48

## 2020-04-01 RX ADMIN — Medication 1 APPLICATION(S): at 22:47

## 2020-04-01 NOTE — PROGRESS NOTE BEHAVIORAL HEALTH - CASE SUMMARY
70M with  with unclear PPH, likely bipolar disorder, one prior psychiatric admission s/p SA, presenting to [facility] after hematemesis in the setting of caustic ingestion as a suicide attempt.  As per the patient, he has been feeling depressed, anxious and very guilty during the last few weeks in context of having thoughts that he triggered the current Covid 19 epidemic. Patient reports that he has been having Si for the last 1-2 weeks. Night before admissions to hospital he became very scared that his partner  (checked his pulse and couldn't find it). He went to the bathtub and started drinking from a bottle of bleach in a suicide attempt. As per his partner, patient has been very paranoid for the last 2-3 weeks making statements that he caused the epidemic and "they were being watched from across the hallway", " they are going to come in to get us and put us away". Patient was also having severe insomnia and high anxiety. Patient's partner states that last week he heard the patient making choking sounds and found him with a mouthful of coins (tried to choke himself). Patient was not taken to the ER at that time. Yesterday, Grady was woken up at 4am by the smell of bleach and found the patient in the bathtub covered in blood.  As per Grady, patient saw for 2 years a therapist (until the therapist moved to [place]). Patient never saw a psychiatrist. He is currently prescribed by PMD lexapro either 20mg or 30mg daily (unclear at this time) and trazodone 50mg po qhs."  ;;3/30:  interviewed with treating resident using remote per [facility] hospital policy during pandemic;  constricted;internally preoccupied; expressed remorse re suicide attempt;  however while knew the month and year did not know which hospital he was in and did not seen to be aware that he was on a psychiatric unit;  fair adls; constricted; somewhat sad affect; slow clear speech; fair eye contact; fair adls.  Azulfidine for back burns;  celexa 10mg augmented with Abili...ful of coins (tried to choke himself). Patient was not taken to the ER at that time. Yesterday, Grady was woken up at 4am by the smell of bleach and found the patient in the bathtub covered in blood.  As per Grady, patient saw for 2 years a therapist (until the therapist moved to [place]). Patient never saw a psychiatrist. He is currently prescribed by PMD lexapro either 20mg or 30mg daily (unclear at this time) and trazodone 50mg po qhs."  ;;3/30:  interviewed with treating resident using remote per Critical access hospital policy during pandemic;  constricted;internally preoccupied; expressed remorse re suicide attempt;  however while knew the month and year did not know which hospital he was in and did not seen to be aware that he was on a psychiatric unit;  fair adls; constricted; somewhat sad affect; slow clear speech; fair eye contact; fair adls.  Azulfidine for back burns;  Celexa 10mg augmented with Abilify 20mg for depression and Remeron 7.5mg at night for insomnia;  Norvasc 5mg po daily for HTN.
70M with  with unclear PPH, likely bipolar disorder, one prior psychiatric admission s/p SA, presenting to [facility] after hematemesis in the setting of caustic ingestion as a suicide attempt.  As per the patient, he has been feeling depressed, anxious and very guilty during the last few weeks in context of having thoughts that he triggered the current Covid 19 epidemic. Patient reports that he has been having Si for the last 1-2 weeks. Night before admissions to hospital he became very scared that his partner  (checked his pulse and couldn't find it). He went to the bathtub and started drinking from a bottle of bleach in a suicide attempt. As per his partner, patient has been very paranoid for the last 2-3 weeks making statements that he caused the epidemic and "they were being watched from across the hallway", " they are going to come in to get us and put us away". Patient was also having severe insomnia and high anxiety. Patient's partner states that last week he heard the patient making choking sounds and found him with a mouthful of coins (tried to choke himself). Patient was not taken to the ER at that time. Yesterday, Grady was woken up at 4am by the smell of bleach and found the patient in the bathtub covered in blood.  As per Grady, patient saw for 2 years a therapist (until the therapist moved to [place]). Patient never saw a psychiatrist. He is currently prescribed by PMD lexapro either 20mg or 30mg daily (unclear at this time) and trazodone 50mg po qhs."  ;;3/30:  interviewed with treating resident using remote per [facility] policy during pandemic;  constricted;internally preoccupied; expressed remorse re suicide attempt;  however while knew the month and year did not know which hospital he was in and did not seen to be aware that he was on a psychiatric unit;  fair adls; constricted; somewhat sad affect; slow clear speech; fair eye contact; fair adls.  Azulfidine for back burns;  celexa 10mg augmented with Abili... s/h i/i/p or avh;  speech clear; apartment building with an elevator;  wants to be home.  not eating or drinking;  to add Zyprexa for pychotic depression.  Will give Zyprexxa 2.5mg po stat for social and emotional withdrawal.   Interviewed remoten in presence of staff RN as per [facility] policy in presence of pandemic  Patient is a candidate to return to med surgical unit should the unit need to decant.  ;;:; says back feels better; somewhat constricted and blocked;  states he is okay but thinking is impoverished;  speech a little monotone and slow;  patient aware of plan to transfer patient to medical surgical floor for futhrer care  in view of decanation of unit during the Covid pandemic.  Risk remains MODERATE to HIGH in view of the recent medically consequential attempt and possible psychotic features.   Remains on [facility] Observ[facility]in for sucide but not endorsing SI at this time.   Interviewed remote in presence of trating psychology staff  as per [facility] policy in presence of pandemic

## 2020-04-01 NOTE — PROGRESS NOTE BEHAVIORAL HEALTH - NSBHFUPINTERVALHXFT_PSY_A_CORE
see summary Pt. seen, chart reviewed, case discussed with the Interdisciplinary Treatment Team. As per Nursing Report, pt has kept to himself and has been isolative. he has been compliant with medications.  Pt. seen individually in his room, in the presence of 1:1 aide. He is sitting on a chair next to his walker, wearing a surgical mask. he has been tested for COVID-19 and result is pending. he reports hat he feels better and denies having suicidal ideation. he also denies HI/AV/AH. He describes his sleep as "on and off" and reports having poor appetite. He was told that he might be transferred to another hospital, which made him tearful. he states that he "missed Grady [his partner]" and asked if it would be possible to get discharged home for a day before being transferred, to see his partner. Visitors are not allowed in the hospital due to COVID pandemic. Patient's partner, Grady Obrien [phone] was contacted and updated about the patient's situation and the plan for transfer.

## 2020-04-01 NOTE — PROGRESS NOTE BEHAVIORAL HEALTH - PROBLEM SELECTOR PLAN 1
-C/w Lexapro 20mg daily  -C/w Abilify 10mg daily - consider increasing it to 15mg daily  -Add-on low-dose Mirtazapine for anti-emetic properties and sleep maintenance. May increase to 15mg qhs  - monitor QTC. Consider Latuda if switch from Abilify is needed  -1:1 observation needed while pt optimizes overall motor status and mood -C/w Lexapro 20mg daily  -Increase abilify to 15mg daily  -c/w Add-on low-dose Mirtazapine (7.5 mg) for anti-emetic properties and sleep maintenance. May increase to 15mg qhs  - monitor QTC. Consider Latuda if switch from Abilify is needed  -1:1 observation needed while pt optimizes overall motor status and mood

## 2020-04-01 NOTE — PROGRESS NOTE BEHAVIORAL HEALTH - SUMMARY
70M with  with unclear PPH, likely bipolar disorder, one prior psychiatric admission s/p SA, presenting to [facility] after hematemesis in the setting of caustic ingestion as a suicide attempt.  As per the patient, he has been feeling depressed, anxious and very guilty during the last few weeks in context of having thoughts that he triggered the current Covid 19 epidemic. Patient reports that he has been having Si for the last 1-2 weeks. Night before admissions to hospital he became very scared that his partner  (checked his pulse and couldn't find it). He went to the bathtub and started drinking from a bottle of bleach in a suicide attempt. As per his partner, patient has been very paranoid for the last 2-3 weeks making statements that he caused the epidemic and "they were being watched from across the hallway", " they are going to come in to get us and put us away". Patient was also having severe insomnia and high anxiety. Patient's partner states that last week he heard the patient making choking sounds and found him with a mouthful of coins (tried to choke himself). Patient was not taken to the ER at that time. Yesterday, Grady was woken up at 4am by the smell of bleach and found the patient in the bathtub covered in blood.  As per Grady, patient saw for 2 years a therapist (until the therapist moved to [place]). Patient never saw a psychiatrist. He is currently prescribed by PMD lexapro either 20mg or 30mg daily (unclear at this time) and trazodone 50mg po qhs."    ;;3/30:  interviewed with treating resident using remote per [facility] policy during pandemic;  constricted;internally preoccupied; expressed remorse re suicide attempt;  however while knew the month and year did not know which hospital he was in and did not seen to be aware that he was on a psychiatric unit;  fair adls; constricted; somewhat sad affect; slow clear speech; fair eye contact; fair adls.  Azulfidine for back burns;  celexa 10mg augmented with Abilify 20mg for depression and Remeron 7.5mg at night for insomnia;  Norvasc 5mg po daily for HTN.   ;;3/31:; nury bed; back hurst; sleep ok;  no s/h i/i/p or avh;  speech clear; apartment building with an elevator;  wants to be home.  not eating or drinking;  to add Zyprexa for pychotic depression.  Will give Zyprexxa 2.5mg po stat for social and emotional withdrawal.   Interviewed remoten in presence of staff RN as per [facility] policy in presence of pandemic  Patient is a candidate to return to med surgical unit should the unit need to decant. ... that his partner  (checked his pulse and couldn't find it). He went to the bathtub and started drinking from a bottle of bleach in a suicide attempt. As per his partner, patient has been very paranoid for the last 2-3 weeks making statements that he caused the epidemic and "they were being watched from across the hallway", " they are going to come in to get us and put us away". Patient was also having severe insomnia and high anxiety. Patient's partner states that last week he heard the patient making choking sounds and found him with a mouthful of coins (tried to choke himself). Patient was not taken to the ER at that time. Yesterday, Grady was woken up at 4am by the smell of bleach and found the patient in the bathtub covered in blood.  As per Grady, patient saw for 2 years a therapist (until the therapist moved to [place]). Patient never saw a psychiatrist. He is currently prescribed by PMD lexapro either 20mg or 30mg daily (unclear at this time) and trazodone 50mg po qhs."    ;;3/30:  interviewed with treating resident using remote per [facility] policy during pandemic;  constricted; internally preoccupied; expressed remorse re suicide attempt;  however while knew the month and year did not know which hospital he was in and did not seen to be aware that he was on a psychiatric unit;  fair adls; constricted; somewhat sad affect; slow clear speech; fair eye contact; fair adls.  Azulfidine for back burns;  celexa 10mg augmented with Abilify 20mg for depression and Remeron 7.5mg at night for insomnia;  Norvasc 5mg po daily for HTN.   ;;3/31:; nury bed; back hurst; sleep ok;  no s/h i/i/p or avh;  speech clear; apartment building with an elevator;  wants to be home.  not eating or drinking;  to add Zyprexa for psychotic depression.  Will give Zyprexa 2.5mg po stat for social and emotional withdrawal.   Interviewed remote in presence of staff RN as per [facility] policy in presence of pandemic  Patient is a candidate to return to med surgical unit should the unit need to decant. 70M with  with unclear PPH, likely bipolar disorder, one prior psychiatric admission s/p SA, presenting to [facility] after hematemesis in the setting of caustic ingestion as a suicide attempt.  As per the patient, he has been feeling depressed, anxious and very guilty during the last few weeks in context of having thoughts that he triggered the current Covid 19 epidemic. Patient reports that he has been having Si for the last 1-2 weeks. Night before admissions to hospital he became very scared that his partner  (checked his pulse and couldn't find it). He went to the bathtub and started drinking from a bottle of bleach in a suicide attempt. As per his partner, patient has been very paranoid for the last 2-3 weeks making statements that he caused the epidemic and "they were being watched from across the hallway", " they are going to come in to get us and put us away". Patient was also having severe insomnia and high anxiety. Patient's partner states that last week he heard the patient making choking sounds and found him with a mouthful of coins (tried to choke himself). Patient was not taken to the ER at that time. Yesterday, Grady was woken up at 4am by the smell of bleach and found the patient in the bathtub covered in blood.  As per Grady, patient saw for 2 years a therapist (until the therapist moved to [place]). Patient never saw a psychiatrist. He is currently prescribed by PMD lexapro either 20mg or 30mg daily (unclear at this time) and trazodone 50mg po qhs."    ;;3/30:  interviewed with treating resident using remote per [facility] policy during pandemic;  constricted;internally preoccupied; expressed remorse re suicide attempt;  however while knew the month and year did not know which hospital he was in and did not seen to be aware that he was on a psychiatric unit;  fair adls; constricted; somewhat sad affect; slow clear speech; fair eye contact; fair adls.  Azulfidine for back burns;  celexa 10mg augmented with Abilify 20mg for depression and Remeron 7.5mg at night for insomnia;  Norvasc 5mg po daily for HTN.   ;;3/31:; nury bed; back hurst; sleep ok;  no s/h i/i/p or avh;  speech clear; apartment building with an elevator;  wants to be home.  not eating or drinking;  to add Zyprexa for pychotic depression.  Will give Zyprexxa 2.5mg po stat for social and emotional withdrawal.   Interviewed remoten in presence of staff RN as per [facility] policy in presence of pandemic  Patient is a candidate to return to med surgical unit should the unit need to decant. ... that his partner  (checked his pulse and couldn't find it). He went to the bathtub and started drinking from a bottle of bleach in a suicide attempt. As per his partner, patient has been very paranoid for the last 2-3 weeks making statements that he caused the epidemic and "they were being watched from across the hallway", " they are going to come in to get us and put us away". Patient was also having severe insomnia and high anxiety. Patient's partner states that last week he heard the patient making choking sounds and found him with a mouthful of coins (tried to choke himself). Patient was not taken to the ER at that time. Yesterday, Grady was woken up at 4am by the smell of bleach and found the patient in the bathtub covered in blood.  As per Grady, patient saw for 2 years a therapist (until the therapist moved to [place]). Patient never saw a psychiatrist. He is currently prescribed by PMD lexapro either 20mg or 30mg daily (unclear at this time) and trazodone 50mg po qhs."    ;;3/30:  interviewed with treating resident using remote per [facility] policy during pandemic;  constricted; internally preoccupied; expressed remorse re suicide attempt;  however while knew the month and year did not know which hospital he was in and did not seen to be aware that he was on a psychiatric unit;  fair adls; constricted; somewhat sad affect; slow clear speech; fair eye contact; fair adls.  Azulfidine for back burns;  celexa 10mg augmented with Abilify 20mg for depression and Remeron 7.5mg at night for insomnia;  Norvasc 5mg po daily for HTN.   ;;3/31:; nury bed; back hurst; sleep ok;  no s/h i/i/p or avh;  speech clear; apartment building with an elevator;  wants to be home.  not eating or drinking;  to add Zyprexa for psychotic depression.  Will give Zyprexa 2.5mg po stat for social and emotional withdrawal.   Interviewed remote in presence of staff RN as per [facility] policy in presence of pandemic  Patient is a candidate to return to med surgical unit should the unit need to decant.  : Patient's mood mildly improved. will increase Abilify to 15 mg

## 2020-04-01 NOTE — CHART NOTE - NSCHARTNOTEFT_GEN_A_CORE
OVERNIGHT EVENTS: No acute overnight events.    SUBJECTIVE / INTERVAL HPI: Patient seen and examined at bedside. Patient says burn on back still causing some discomfort but improved from yesterday. Plastic Surgery consult called and patient evaluated by Dr. Bradley via [facility].     VITAL SIGNS:  Vital Signs Last 24 Hrs  T(C): 36.6 (30 Mar 2020 15:55), Max: 36.6 (30 Mar 2020 15:55)  T(F): 97.8 (30 Mar 2020 15:55), Max: 97.8 (30 Mar 2020 15:55)  HR: 89 (31 Mar 2020 10:14) (84 - 89)  BP: 140/90 (31 Mar 2020 10:14) (140/90 - 151/88)  RR: 18 (30 Mar 2020 15:55) (18 - 18)  SpO2: 98% (30 Mar 2020 15:55) (98% - 98%)    PHYSICAL EXAM:  General: WDWN male sitting up in a chair leaning on his walker  Cardiovascular: +S1/S2; RRR  Respiratory: CTA with good respiratory effort, comfortable on room air  Gastrointestinal: soft  Extremities: WWP; no edema  Vascular: strong pulses  Skin: diffuse desquamating burn on the back from thoracic spine down to the sacrum, covered with bandages, appears dry without active purulence or erythema  Neurological: Alert and oriented, conversant without neuro-deficit  Psych: flat affect    MEDICATIONS  (STANDING):  amLODIPine   Tablet 5 milliGRAM(s) Oral daily  ARIPiprazole 10 milliGRAM(s) Oral daily  escitalopram 20 milliGRAM(s) Oral daily  mirtazapine 7.5 milliGRAM(s) Oral at bedtime  pantoprazole    Tablet 40 milliGRAM(s) Oral two times a day  silver sulfADIAZINE 1% Cream 1 Application(s) Topical two times a day    MEDICATIONS  (PRN):  acetaminophen   Tablet .. 650 milliGRAM(s) Oral every 6 hours PRN Moderate Pain (4 - 6)  LORazepam     Tablet 1 milliGRAM(s) Oral every 6 hours PRN Agitation  melatonin 5 milliGRAM(s) Oral at bedtime PRN Insomnia    ALLERGIES:  No Known Allergies or Intolerances    LABS:  n/a    CAPILLARY BLOOD GLUCOSE    RADIOLOGY & ADDITIONAL TESTS: n/a    ASSESSMENT:  Patient is a 70 bipolar male (s/p multiple suicide attempts) who presented 3/27 s/p toxic ingestion with bleach with a coin visualized on abdominal imaging. Initial problems mostly resolved: hematemesis, hypernatremia with coin visualized in the intestines on 3/27 (GI following). Extensive burn on patient's back still present causing discomfort, wound care following for ongoing care.    PLAN:   -continue with PPI per GI  -coin beyond the stomach but with patient denying having passed it in his stool. Per GI, no need for surgical intervention once past the stomach; can observe for now but monitor for worsening abdominal pain or blood in the stool  -appreciate wound care management of back: continue with silvadene ointment BID and cover wound  -Plastic surgery recommendations: continue to use silvadene BID on the affected area and follow up with Dr. Bradley in outpatient setting: [phone]: [facility] office  -remain well hydrated while burn remains on back; keep covered, will cause fluid loss  -continue with amlodipine for blood pressure management  -No updated EKG in the chart; Please repeat EKG for signs of downtrending QTc after toxic ingestion    Discussed with Dr. Carty.

## 2020-04-01 NOTE — PROGRESS NOTE BEHAVIORAL HEALTH - OTHER
somewhat flacid with psychomotor retardation noted sitting in chair, uses walker concrete and limited to salutation psychomotor retardation noted "better"

## 2020-04-02 VITALS
DIASTOLIC BLOOD PRESSURE: 80 MMHG | HEART RATE: 84 BPM | TEMPERATURE: 98 F | OXYGEN SATURATION: 98 % | SYSTOLIC BLOOD PRESSURE: 130 MMHG | RESPIRATION RATE: 18 BRPM

## 2020-04-02 PROCEDURE — 97161 PT EVAL LOW COMPLEX 20 MIN: CPT

## 2020-04-02 PROCEDURE — 36415 COLL VENOUS BLD VENIPUNCTURE: CPT

## 2020-04-02 PROCEDURE — 80048 BASIC METABOLIC PNL TOTAL CA: CPT

## 2020-04-02 PROCEDURE — 80061 LIPID PANEL: CPT

## 2020-04-02 PROCEDURE — 99232 SBSQ HOSP IP/OBS MODERATE 35: CPT

## 2020-04-02 PROCEDURE — 87635 SARS-COV-2 COVID-19 AMP PRB: CPT

## 2020-04-02 PROCEDURE — 83036 HEMOGLOBIN GLYCOSYLATED A1C: CPT

## 2020-04-02 PROCEDURE — 97116 GAIT TRAINING THERAPY: CPT

## 2020-04-02 PROCEDURE — 99233 SBSQ HOSP IP/OBS HIGH 50: CPT | Mod: GC

## 2020-04-02 RX ORDER — OXYCODONE AND ACETAMINOPHEN 5; 325 MG/1; MG/1
1 TABLET ORAL EVERY 4 HOURS
Refills: 0 | Status: DISCONTINUED | OUTPATIENT
Start: 2020-04-02 | End: 2020-04-02

## 2020-04-02 RX ORDER — ARIPIPRAZOLE 15 MG/1
1 TABLET ORAL
Qty: 0 | Refills: 0 | DISCHARGE
Start: 2020-04-02

## 2020-04-02 RX ORDER — ESCITALOPRAM OXALATE 10 MG/1
1 TABLET, FILM COATED ORAL
Qty: 0 | Refills: 0 | DISCHARGE
Start: 2020-04-02

## 2020-04-02 RX ORDER — MIRTAZAPINE 45 MG/1
1 TABLET, ORALLY DISINTEGRATING ORAL
Qty: 0 | Refills: 0 | DISCHARGE
Start: 2020-04-02

## 2020-04-02 RX ADMIN — AMLODIPINE BESYLATE 5 MILLIGRAM(S): 2.5 TABLET ORAL at 09:59

## 2020-04-02 RX ADMIN — PANTOPRAZOLE SODIUM 40 MILLIGRAM(S): 20 TABLET, DELAYED RELEASE ORAL at 09:58

## 2020-04-02 RX ADMIN — Medication 1 APPLICATION(S): at 09:58

## 2020-04-02 RX ADMIN — ARIPIPRAZOLE 15 MILLIGRAM(S): 15 TABLET ORAL at 09:58

## 2020-04-02 RX ADMIN — ESCITALOPRAM OXALATE 20 MILLIGRAM(S): 10 TABLET, FILM COATED ORAL at 09:58

## 2020-04-02 NOTE — PROGRESS NOTE BEHAVIORAL HEALTH - PROBLEM SELECTOR PLAN 4
-Management as per Medicine  -C/w Norvasc 5mg daily  -Will monitor VS

## 2020-04-02 NOTE — PROGRESS NOTE BEHAVIORAL HEALTH - AXIS III
S/P total knee replacement, right  H/O hernia repair

## 2020-04-02 NOTE — PROGRESS NOTE BEHAVIORAL HEALTH - RISK ASSESSMENT
Suicide risk - high; recent psychosis; active mood sx; impulsivity; hopelessness and dispair
recent SA  psychosis
Suicide risk - high; recent psychosis; active mood sx; impulsivity; hopelessness and dispair
Suicide risk - high; recent psychosis; active mood sx; impulsivity; hopelessness and dispair
Risk elements: lives with family; appears depressed; history of bipolar disorder; may have history of violence and impulsivity; concerns about financial stability; unable to sleep; appears able to comprehend situation; -family; recent death; has or can make important interpersonal relationships; may have negative thinking;   At time of admission suicide risk was HIGH.  Now MODERATE with medication and milieu/     Static: mood issues; mood episodes; past violence; recent  stress or humiliation; sleep issues; loss of significant other; has capability to make important interpersonal relationships;     Modifiable: treat underlying mood issues; reduce aggressive potential with treatment; improve coping skills; improve sleep by addrressing mood  or anxiety issues; assist engaging possible support network; help pt deal with loss; assist in managing importanti interpersonal relationships; address possible anger issues;     Protective: involved family; cognitively able to engage in treatment; review for degree of family engagement; has capability to make important interpersonal relationships; has capacity to report internal thoughts to care providers;   Modifiable factors addressed in treatment plan; see summary and interval data for updates

## 2020-04-02 NOTE — PROGRESS NOTE BEHAVIORAL HEALTH - SECONDARY DX3
Suicide and self-inflicted poisoning by corrosive and caustic substances, sequela

## 2020-04-02 NOTE — PROGRESS NOTE BEHAVIORAL HEALTH - NSBHADMITIPREASON_PSY_A_CORE
Danger to self; mental illness expected to respond to inpatient care
Danger to self; mental illness expected to respond to inpatient care/suicide
Danger to self; mental illness expected to respond to inpatient care/suicide
suicide/Danger to self; mental illness expected to respond to inpatient care
suicide/Danger to self; mental illness expected to respond to inpatient care

## 2020-04-02 NOTE — PROGRESS NOTE BEHAVIORAL HEALTH - PROBLEM SELECTOR PLAN 2
-F/u Wound-care recs  *Silvadene BID in the affected area

## 2020-04-02 NOTE — PROGRESS NOTE BEHAVIORAL HEALTH - PROBLEM SELECTOR PLAN 1
-C/w Lexapro 20mg daily  -Increase abilify to 15mg daily  -c/w Add-on low-dose Mirtazapine (7.5 mg) for anti-emetic properties and sleep maintenance. May increase to 15mg qhs  - monitor QTC. Consider Latuda if switch from Abilify is needed  -1:1 observation needed while pt optimizes overall motor status and mood

## 2020-04-02 NOTE — PROGRESS NOTE BEHAVIORAL HEALTH - PRIMARY DX
Psychosis, unspecified psychosis type
Bipolar disorder, current episode depressed, severe, with psychotic features

## 2020-04-02 NOTE — PROGRESS NOTE BEHAVIORAL HEALTH - OTHER
psychomotor retardation noted sitting in chair, uses walker "better" concrete and limited to salutation

## 2020-04-02 NOTE — CHART NOTE - NSCHARTNOTEFT_GEN_A_CORE
OVERNIGHT EVENTS: No acute events overnight.     SUBJECTIVE / INTERVAL HPI: Patient seen and examined. Patient states he has not yet passed the coin in his stool, denies melena or abdominal pain at rest. His back is still causing him pain at a 5/10 severity. Receiving tylenol.     VITAL SIGNS:  Vital Signs Last 24 Hrs  T(C): 36.8 (02 Apr 2020 08:45), Max: 37.1 (01 Apr 2020 16:15)  T(F): 98.3 (02 Apr 2020 08:45), Max: 98.8 (01 Apr 2020 16:15)  HR: 84 (02 Apr 2020 08:45) (84 - 85)  BP: 130/80 (02 Apr 2020 08:45) (115/77 - 130/80)  RR: 18 (02 Apr 2020 08:45) (18 - 18)  SpO2: 98% (02 Apr 2020 08:45) (97% - 98%)    PHYSICAL EXAM:  General: elderly male hunched over his walker sitting in a chair staring forward/down  Neck: supple  Cardiovascular: +S1/S2; RRR  Respiratory: CTA B/L; no W/R/R  Gastrointestinal: soft, tender with deep palpation just below the umbilicus but patient says no pain present otherwise  Extremities: WWP; no edema, clubbing or cyanosis  Skin: patient's back is covered in dressings with silvadene cream beneath, coming out the sides.  Vascular: 2+ radial, DP/PT pulses B/L  Neurological: AAOx3; no focal deficits    MEDICATIONS  (STANDING):  amLODIPine   Tablet 5 milliGRAM(s) Oral daily  ARIPiprazole 15 milliGRAM(s) Oral daily  escitalopram 20 milliGRAM(s) Oral daily  mirtazapine 7.5 milliGRAM(s) Oral at bedtime  pantoprazole    Tablet 40 milliGRAM(s) Oral two times a day  silver sulfADIAZINE 1% Cream 1 Application(s) Topical two times a day    MEDICATIONS  (PRN):  acetaminophen   Tablet .. 650 milliGRAM(s) Oral every 6 hours PRN Moderate Pain (4 - 6)  LORazepam     Tablet 1 milliGRAM(s) Oral every 6 hours PRN Agitation  magnesium hydroxide Suspension 30 milliLiter(s) Oral daily PRN Constipation  melatonin 5 milliGRAM(s) Oral at bedtime PRN Insomnia      ALLERGIES:  No Known Allergies or intolerances    LABS:    CAPILLARY BLOOD GLUCOSE    RADIOLOGY & ADDITIONAL TESTS:  EKG from 3/27: QTc 456    ASSESSMENT:  Patient is a 70 bipolar male (s/p multiple suicide attempts) who presented 3/27 s/p toxic ingestion with bleach with a coin visualized on abdominal imaging. Initial problems mostly resolved: hematemesis, hypernatremia with coin visualized in the intestines on 3/27 (GI following). Extensive burn on patient's back still present causing discomfort, wound care following for ongoing care.    PLAN:   -continue with PPI per GI  -coin beyond the stomach but with patient denying having passed it in his stool. Per GI, no need for surgical intervention once past the stomach; can observe for now but monitor for worsening abdominal pain or blood in the stool; patient has not yet passed the coin  -appreciate wound care management of back: continue with silvadene ointment BID and cover wound  -Plastic surgery recommendations: continue to use silvadene BID on the affected area and follow up with Dr. Bradley in outpatient setting: [phone]: [facility] office upon discharge  -remain well hydrated while burn remains on back; keep covered, will cause fluid loss  -continue with amlodipine for blood pressure management  -EKG with Qtc trending down 519-->456  -Can give patient percocet q4h PRN for back pain given insufficient analgesia with tylenol alone

## 2020-04-02 NOTE — PROGRESS NOTE BEHAVIORAL HEALTH - PROBLEM SELECTOR PLAN 3
-C/w Medicine recs;  -Pantoprazole 40mg BID

## 2020-04-02 NOTE — PROGRESS NOTE BEHAVIORAL HEALTH - SUMMARY
70M with  with unclear PPH, likely bipolar disorder, one prior psychiatric admission s/p SA, presenting to [facility] after hematemesis in the setting of caustic ingestion as a suicide attempt.  As per the patient, he has been feeling depressed, anxious and very guilty during the last few weeks in context of having thoughts that he triggered the current Covid 19 epidemic. Patient reports that he has been having Si for the last 1-2 weeks. Night before admissions to hospital he became very scared that his partner  (checked his pulse and couldn't find it). He went to the bathtub and started drinking from a bottle of bleach in a suicide attempt. As per his partner, patient has been very paranoid for the last 2-3 weeks making statements that he caused the epidemic and "they were being watched from across the hallway", " they are going to come in to get us and put us away". Patient was also having severe insomnia and high anxiety. Patient's partner states that last week he heard the patient making choking sounds and found him with a mouthful of coins (tried to choke himself). Patient was not taken to the ER at that time. Yesterday, Grady was woken up at 4am by the smell of bleach and found the patient in the bathtub covered in blood.  As per Grady, patient saw for 2 years a therapist (until the therapist moved to [place]). Patient never saw a psychiatrist. He is currently prescribed by PMD lexapro either 20mg or 30mg daily (unclear at this time) and trazodone 50mg po qhs."    ;;3/30:  interviewed with treating resident using remote per On license of [facility] policy during pandemic;  constricted;internally preoccupied; expressed remorse re suicide attempt;  however while knew the month and year did not know which hospital he was in and did not seen to be aware that he was on a psychiatric unit;  fair adls; constricted; somewhat sad affect; slow clear speech; fair eye contact; fair adls.  Azulfidine for back burns;  celexa 10mg augmented with Abilify 20mg for depression and Remeron 7.5mg at night for insomnia;  Norvasc 5mg po daily for HTN.   ;;3/31:; nury bed; back hurst; sleep ok;  no s/h i/i/p or avh;  speech clear; apartment building with an elevator;  wants to be home.  not eating or drinking;  to add Zyprexa for pychotic depression.  Will give Zyprexxa 2.5mg po stat for social and emotional withdrawal.   Interviewed remoten in presence of staff RN as per On license of [facility] policy in presence of pandemic  Patient is a candidate to return to med surgical unit should the unit need to decant.   ;;:; says back feels better; somewhat constricted and blocked;  states he is okay but thinking is impoverished;  speech a little monotone and slow;  patient aware of plan to transfer patient to medical surgical floor for futhrer care  in view of decanation of unit during the Covid pandemic.  Risk remains MODERATE to HIGH in view of the recent medically consequential attempt and possible psychotic features.   Remains on Constant Observatoin for sucid...  interviewed with treating resident using remote per On license of [facility] policy during pandemic;  constricted;internally preoccupied; expressed remorse re suicide attempt;  however while knew the month and year did not know which hospital he was in and did not seen to be aware that he was on a psychiatric unit;  fair adls; constricted; somewhat sad affect; slow clear speech; fair eye contact; fair adls.  Azulfidine for back burns;  celexa 10mg augmented with Abilify 20mg for depression and Remeron 7.5mg at night for insomnia;  Norvasc 5mg po daily for HTN.   ;;3/31:; nury bed; back hurst; sleep ok;  no s/h i/i/p or avh;  speech clear; apartment building with an elevator;  wants to be home.  not eating or drinking;  to add Zyprexa for pychotic depression.  Will give Zyprexxa 2.5mg po stat for social and emotional withdrawal.   Interviewed remoten in presence of staff RN as per On license of [facility] policy in presence of pandemic  Patient is a candidate to return to med surgical unit should the unit need to decant.   ;;4:; says back feels better; somewhat constricted and blocked;  states he is okay but thinking is impoverished;  speech a little monotone and slow;  patient aware of plan to transfer patient to medical surgical floor for further care  in view of decanation of unit during the Covid pandemic.  Risk remains MODERATE to HIGH in view of the recent medically consequential attempt and possible psychotic features.   Remains on Constant Observation for suicide but not endorsing SI at this time.   Interviewed remote in presence of treating psychology staff  as per On license of [facility] policy in presence of pandemic  ;;: continues sad; constricted; vague about SI; blocked and preoccupied; complains about back pain  Dr Granda of medicine consulted:  QTc <500ms and acceptable; electrolytes normalized; recommended Percocet for moderate to severe back pain.  For transfer to [facility] in view of continued psychiatric sxs and closure of this unit due to Covid pandemic and the need for further inpatient care.

## 2020-04-02 NOTE — PROGRESS NOTE BEHAVIORAL HEALTH - NSBHADMITDANGERSELF_PSY_A_CORE
suicidal ideation with plan and means
suicidal behavior

## 2020-04-02 NOTE — PROGRESS NOTE BEHAVIORAL HEALTH - PROBLEM SELECTOR PROBLEM 3
Caustic burn of esophagus, sequela

## 2020-04-02 NOTE — PROGRESS NOTE BEHAVIORAL HEALTH - NSBHCHARTREVIEWVS_PSY_A_CORE FT
Vital Signs Last 24 Hrs  T(C): 37.1 (01 Apr 2020 16:15), Max: 37.1 (01 Apr 2020 16:15)  T(F): 98.8 (01 Apr 2020 16:15), Max: 98.8 (01 Apr 2020 16:15)  HR: 85 (01 Apr 2020 16:15) (85 - 90)  BP: 115/77 (01 Apr 2020 16:15) (109/63 - 115/77)  BP(mean): --  RR: 18 (01 Apr 2020 16:15) (18 - 20)  SpO2: 97% (01 Apr 2020 16:15) (95% - 97%)

## 2020-04-02 NOTE — PROGRESS NOTE BEHAVIORAL HEALTH - NSBHADMITIPOBSFT_PSY_A_CORE
s/p SA
suicide/motor limitations

## 2020-04-06 DIAGNOSIS — F41.9 ANXIETY DISORDER, UNSPECIFIED: ICD-10-CM

## 2020-04-06 DIAGNOSIS — D72.829 ELEVATED WHITE BLOOD CELL COUNT, UNSPECIFIED: ICD-10-CM

## 2020-04-06 DIAGNOSIS — F29 UNSPECIFIED PSYCHOSIS NOT DUE TO A SUBSTANCE OR KNOWN PHYSIOLOGICAL CONDITION: ICD-10-CM

## 2020-04-06 DIAGNOSIS — T21.54XA: ICD-10-CM

## 2020-04-06 DIAGNOSIS — T14.91XA SUICIDE ATTEMPT, INITIAL ENCOUNTER: ICD-10-CM

## 2020-04-06 DIAGNOSIS — F31.5 BIPOLAR DISORDER, CURRENT EPISODE DEPRESSED, SEVERE, WITH PSYCHOTIC FEATURES: ICD-10-CM

## 2020-04-06 DIAGNOSIS — T28.6XXA CORROSION OF ESOPHAGUS, INITIAL ENCOUNTER: ICD-10-CM

## 2020-04-06 DIAGNOSIS — E87.0 HYPEROSMOLALITY AND HYPERNATREMIA: ICD-10-CM

## 2020-04-06 DIAGNOSIS — T18.9XXA FOREIGN BODY OF ALIMENTARY TRACT, PART UNSPECIFIED, INITIAL ENCOUNTER: ICD-10-CM

## 2020-04-06 DIAGNOSIS — Z96.651 PRESENCE OF RIGHT ARTIFICIAL KNEE JOINT: ICD-10-CM

## 2020-04-06 DIAGNOSIS — G47.00 INSOMNIA, UNSPECIFIED: ICD-10-CM
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
## 2020-04-06 DIAGNOSIS — R45.1 RESTLESSNESS AND AGITATION: ICD-10-CM

## 2020-04-06 DIAGNOSIS — R94.31 ABNORMAL ELECTROCARDIOGRAM [ECG] [EKG]: ICD-10-CM

## 2020-04-06 DIAGNOSIS — T54.92XA TOXIC EFFECT OF UNSPECIFIED CORROSIVE SUBSTANCE, INTENTIONAL SELF-HARM, INITIAL ENCOUNTER: ICD-10-CM

## 2020-04-06 DIAGNOSIS — I10 ESSENTIAL (PRIMARY) HYPERTENSION: ICD-10-CM

## 2020-04-06 DIAGNOSIS — K92.0 HEMATEMESIS: ICD-10-CM

## 2020-05-11 ENCOUNTER — APPOINTMENT (OUTPATIENT)
Dept: GASTROENTEROLOGY | Facility: CLINIC | Age: 70
End: 2020-05-11

## 2020-06-18 NOTE — PATIENT PROFILE ADULT - DOES PATIENT HAVE ADVANCE DIRECTIVE
Please call patient and let her know her mmg from [facility] is normal (done on 6/16/20)    Report faxed and will be scanned into media     no

## 2020-09-28 NOTE — ED ADULT NURSE NOTE - ED CARDIAC CAPILLARY REFILL
Last seen: 9/2/20  Next appointment:   Last filled: 4/6/20      Ok to fill?  Please advise.      2 seconds or less

## 2020-10-29 NOTE — H&P ADULT - PROBLEM/PLAN-9
DISPLAY PLAN FREE TEXT SSKI Counseling:  I discussed with the patient the risks of SSKI including but not limited to thyroid abnormalities, metallic taste, GI upset, fever, headache, acne, arthralgias, paraesthesias, lymphadenopathy, easy bleeding, arrhythmias, and allergic reaction.

## 2021-02-11 NOTE — PHYSICAL THERAPY INITIAL EVALUATION ADULT - SIT-TO-STAND BALANCE
53 YO M with hx CKD s/p renal transplant, CVA, DM, neuropathy, DVT, HLD, Hypertension ,Obesity, squamous cell carcinoma, presents to ED for redness/swelling R leg, admitted for osteomyelitis of R 3rd distal phalanx  poor plus

## 2022-01-01 NOTE — PROGRESS NOTE ADULT - PROBLEM SELECTOR PLAN 6
Likely 2/2 to caustic ingestion causing upper GI irritation. Management as above per GI    #hyperglycemia and obesity  - A1c  - insulin coverage as necessary pending a1c 6.740

## 2023-01-01 NOTE — PHYSICAL THERAPY INITIAL EVALUATION ADULT - PLANNED THERAPY INTERVENTIONS, PT EVAL
DISPLAY PLAN FREE TEXT balance training/bed mobility training/strengthening/transfer training/gait training

## 2023-03-01 NOTE — BEHAVIORAL HEALTH ASSESSMENT NOTE - NSBHADMITIPOBS_PSY_A_CORE
Change Eliquis to loading dose  Outpatient follow-up with PCP for routine cancer screening   Routine observation Constant observation

## 2023-11-29 NOTE — ED PROVIDER NOTE - NS ED MD DISPO DIVISION
Addended by: BISI WEAVER on: 11/29/2023 02:42 PM     Modules accepted: Orders    
[facility]

## 2025-07-18 NOTE — ED ADULT NURSE NOTE - PMH
"good"/Normal "good"/Normal "good"/Normal "good"/Normal "good"/Normal Bipolar 1 disorder    Depression    Schizophrenia "good"/Normal "good"/Euphoria "good"/Normal "good"/Normal "good"/Normal "good"/Normal "good"/Normal